# Patient Record
Sex: MALE | Race: BLACK OR AFRICAN AMERICAN | Employment: FULL TIME | URBAN - METROPOLITAN AREA
[De-identification: names, ages, dates, MRNs, and addresses within clinical notes are randomized per-mention and may not be internally consistent; named-entity substitution may affect disease eponyms.]

---

## 2017-09-27 ENCOUNTER — HOSPITAL ENCOUNTER (EMERGENCY)
Age: 42
Discharge: HOME | End: 2017-09-27
Attending: EMERGENCY MEDICINE

## 2017-09-27 ENCOUNTER — APPOINTMENT (OUTPATIENT)
Dept: GENERAL RADIOLOGY | Age: 42
End: 2017-09-27

## 2017-09-27 VITALS
DIASTOLIC BLOOD PRESSURE: 115 MMHG | BODY MASS INDEX: 30.43 KG/M2 | TEMPERATURE: 98.2 F | WEIGHT: 229.6 LBS | HEART RATE: 66 BPM | OXYGEN SATURATION: 98 % | SYSTOLIC BLOOD PRESSURE: 163 MMHG | HEIGHT: 73 IN | RESPIRATION RATE: 18 BRPM

## 2017-09-27 DIAGNOSIS — I10 HYPERTENSION, UNCONTROLLED: Primary | ICD-10-CM

## 2017-09-27 LAB
ABSOLUTE EOS #: 0.1 K/UL (ref 0–0.4)
ABSOLUTE LYMPH #: 2.3 K/UL (ref 1–4.8)
ABSOLUTE MONO #: 0.5 K/UL (ref 0.2–0.8)
ANION GAP SERPL CALCULATED.3IONS-SCNC: 10 MMOL/L (ref 9–17)
BASOPHILS # BLD: 1 %
BASOPHILS ABSOLUTE: 0 K/UL (ref 0–0.2)
BUN BLDV-MCNC: 9 MG/DL (ref 6–20)
BUN/CREAT BLD: 10 (ref 9–20)
CALCIUM SERPL-MCNC: 9.3 MG/DL (ref 8.6–10.4)
CHLORIDE BLD-SCNC: 105 MMOL/L (ref 98–107)
CO2: 26 MMOL/L (ref 20–31)
CREAT SERPL-MCNC: 0.87 MG/DL (ref 0.7–1.2)
DIFFERENTIAL TYPE: NORMAL
EOSINOPHILS RELATIVE PERCENT: 3 %
GFR AFRICAN AMERICAN: >60 ML/MIN
GFR NON-AFRICAN AMERICAN: >60 ML/MIN
GFR SERPL CREATININE-BSD FRML MDRD: NORMAL ML/MIN/{1.73_M2}
GFR SERPL CREATININE-BSD FRML MDRD: NORMAL ML/MIN/{1.73_M2}
GLUCOSE BLD-MCNC: 85 MG/DL (ref 70–99)
HCT VFR BLD CALC: 45 % (ref 41–53)
HEMOGLOBIN: 15.1 G/DL (ref 13.5–17.5)
LYMPHOCYTES # BLD: 48 %
MCH RBC QN AUTO: 29.3 PG (ref 26–34)
MCHC RBC AUTO-ENTMCNC: 33.5 G/DL (ref 31–37)
MCV RBC AUTO: 87.6 FL (ref 80–100)
MONOCYTES # BLD: 11 %
PDW BLD-RTO: 12.7 % (ref 11.5–14.5)
PLATELET # BLD: 243 K/UL (ref 130–400)
PLATELET ESTIMATE: NORMAL
PMV BLD AUTO: NORMAL FL (ref 6–12)
POTASSIUM SERPL-SCNC: 3.8 MMOL/L (ref 3.7–5.3)
RBC # BLD: 5.14 M/UL (ref 4.5–5.9)
RBC # BLD: NORMAL 10*6/UL
SEG NEUTROPHILS: 37 %
SEGMENTED NEUTROPHILS ABSOLUTE COUNT: 1.8 K/UL (ref 1.8–7.7)
SODIUM BLD-SCNC: 141 MMOL/L (ref 135–144)
TROPONIN INTERP: NORMAL
TROPONIN T: <0.03 NG/ML
WBC # BLD: 4.7 K/UL (ref 3.5–11)
WBC # BLD: NORMAL 10*3/UL

## 2017-09-27 PROCEDURE — 99284 EMERGENCY DEPT VISIT MOD MDM: CPT

## 2017-09-27 PROCEDURE — 80048 BASIC METABOLIC PNL TOTAL CA: CPT

## 2017-09-27 PROCEDURE — 6370000000 HC RX 637 (ALT 250 FOR IP): Performed by: NURSE PRACTITIONER

## 2017-09-27 PROCEDURE — 71020 XR CHEST STANDARD TWO VW: CPT

## 2017-09-27 PROCEDURE — 93005 ELECTROCARDIOGRAM TRACING: CPT

## 2017-09-27 PROCEDURE — 84484 ASSAY OF TROPONIN QUANT: CPT

## 2017-09-27 PROCEDURE — 85025 COMPLETE CBC W/AUTO DIFF WBC: CPT

## 2017-09-27 RX ORDER — AMLODIPINE BESYLATE 10 MG/1
10 TABLET ORAL DAILY
Qty: 30 TABLET | Refills: 0 | Status: SHIPPED | OUTPATIENT
Start: 2017-09-27 | End: 2022-05-25

## 2017-09-27 RX ORDER — CLONIDINE HYDROCHLORIDE 0.1 MG/1
0.1 TABLET ORAL ONCE
Status: COMPLETED | OUTPATIENT
Start: 2017-09-27 | End: 2017-09-27

## 2017-09-27 RX ORDER — HYDROCHLOROTHIAZIDE 25 MG/1
25 TABLET ORAL DAILY
Qty: 30 TABLET | Refills: 0 | Status: SHIPPED | OUTPATIENT
Start: 2017-09-27 | End: 2022-05-25

## 2017-09-27 RX ADMIN — CLONIDINE HYDROCHLORIDE 0.1 MG: 0.1 TABLET ORAL at 11:47

## 2017-09-27 ASSESSMENT — PAIN SCALES - GENERAL: PAINLEVEL_OUTOF10: 6

## 2017-09-27 ASSESSMENT — PAIN DESCRIPTION - DESCRIPTORS: DESCRIPTORS: ACHING

## 2017-09-27 ASSESSMENT — PAIN SCALES - WONG BAKER: WONGBAKER_NUMERICALRESPONSE: 6

## 2017-09-27 ASSESSMENT — PAIN DESCRIPTION - LOCATION: LOCATION: HEAD

## 2017-09-27 ASSESSMENT — PAIN DESCRIPTION - FREQUENCY: FREQUENCY: INTERMITTENT

## 2017-09-28 LAB
EKG ATRIAL RATE: 60 BPM
EKG P AXIS: 11 DEGREES
EKG P-R INTERVAL: 202 MS
EKG Q-T INTERVAL: 452 MS
EKG QRS DURATION: 108 MS
EKG QTC CALCULATION (BAZETT): 452 MS
EKG R AXIS: -25 DEGREES
EKG T AXIS: -15 DEGREES
EKG VENTRICULAR RATE: 60 BPM

## 2022-05-25 ENCOUNTER — APPOINTMENT (OUTPATIENT)
Dept: CT IMAGING | Age: 47
DRG: 958 | End: 2022-05-25
Payer: COMMERCIAL

## 2022-05-25 ENCOUNTER — APPOINTMENT (OUTPATIENT)
Dept: GENERAL RADIOLOGY | Age: 47
DRG: 958 | End: 2022-05-25
Payer: COMMERCIAL

## 2022-05-25 ENCOUNTER — APPOINTMENT (OUTPATIENT)
Dept: CT IMAGING | Age: 47
End: 2022-05-25
Payer: COMMERCIAL

## 2022-05-25 ENCOUNTER — APPOINTMENT (OUTPATIENT)
Dept: GENERAL RADIOLOGY | Age: 47
End: 2022-05-25
Payer: COMMERCIAL

## 2022-05-25 ENCOUNTER — HOSPITAL ENCOUNTER (EMERGENCY)
Age: 47
Discharge: ANOTHER HOSPITAL | End: 2022-05-25
Attending: EMERGENCY MEDICINE
Payer: COMMERCIAL

## 2022-05-25 ENCOUNTER — HOSPITAL ENCOUNTER (INPATIENT)
Age: 47
LOS: 8 days | Discharge: NURSING, REHAB OR LONG-TERM CARE | DRG: 958 | End: 2022-06-02
Attending: EMERGENCY MEDICINE | Admitting: SURGERY
Payer: COMMERCIAL

## 2022-05-25 VITALS
HEART RATE: 75 BPM | BODY MASS INDEX: 30.16 KG/M2 | WEIGHT: 235 LBS | TEMPERATURE: 98.2 F | RESPIRATION RATE: 15 BRPM | DIASTOLIC BLOOD PRESSURE: 99 MMHG | HEIGHT: 74 IN | OXYGEN SATURATION: 99 % | SYSTOLIC BLOOD PRESSURE: 150 MMHG
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
## 2022-05-25 DIAGNOSIS — S51.012A ELBOW LACERATION, LEFT, INITIAL ENCOUNTER: ICD-10-CM

## 2022-05-25 DIAGNOSIS — S22.41XA CLOSED FRACTURE OF MULTIPLE RIBS OF RIGHT SIDE, INITIAL ENCOUNTER: ICD-10-CM

## 2022-05-25 DIAGNOSIS — S32.424A CLOSED NONDISPLACED FRACTURE OF POSTERIOR WALL OF RIGHT ACETABULUM, INITIAL ENCOUNTER (HCC): ICD-10-CM
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
## 2022-05-25 DIAGNOSIS — S32.401A CLOSED NONDISPLACED FRACTURE OF RIGHT ACETABULUM, UNSPECIFIED PORTION OF ACETABULUM, INITIAL ENCOUNTER (HCC): Primary | ICD-10-CM

## 2022-05-25 LAB
ABO/RH: NORMAL
ALLEN TEST: ABNORMAL
ANION GAP SERPL CALCULATED.3IONS-SCNC: 10 MMOL/L (ref 9–17)
ANTIBODY SCREEN: NEGATIVE
ARM BAND NUMBER: NORMAL
BLOOD BANK COMMENT: NORMAL
BLOOD BANK SPECIMEN: ABNORMAL
BUN BLDV-MCNC: 15 MG/DL (ref 6–20)
CARBOXYHEMOGLOBIN: 3.8 % (ref 0–5)
CHLORIDE BLD-SCNC: 102 MMOL/L (ref 98–107)
CO2: 26 MMOL/L (ref 20–31)
CREAT SERPL-MCNC: 0.97 MG/DL (ref 0.7–1.2)
ETHANOL PERCENT: <0.01 %
ETHANOL: <10 MG/DL
EXPIRATION DATE: NORMAL
GFR AFRICAN AMERICAN: >60 ML/MIN
GFR NON-AFRICAN AMERICAN: >60 ML/MIN
GFR SERPL CREATININE-BSD FRML MDRD: ABNORMAL ML/MIN/{1.73_M2}
GLUCOSE BLD-MCNC: 119 MG/DL (ref 70–99)
HCG QUALITATIVE: ABNORMAL
HCO3 VENOUS: 28.1 MMOL/L (ref 24–30)
HCT VFR BLD CALC: 36.9 % (ref 41–53)
HEMOGLOBIN: 12.4 G/DL (ref 13.5–17.5)
INR BLD: 1
MCH RBC QN AUTO: 28.5 PG (ref 26–34)
MCHC RBC AUTO-ENTMCNC: 33.5 G/DL (ref 31–37)
MCV RBC AUTO: 85.2 FL (ref 80–100)
METHEMOGLOBIN: 0.8 % (ref 0–1.9)
O2 SAT, VEN: 84.8 % (ref 60–85)
PARTIAL THROMBOPLASTIN TIME: 32 SEC (ref 24–36)
PATIENT TEMP: 37
PCO2, VEN: 43.7 (ref 39–55)
PDW BLD-RTO: 14.7 % (ref 11.5–14.9)
PH VENOUS: 7.42 (ref 7.32–7.42)
PLATELET # BLD: 247 K/UL (ref 150–450)
PMV BLD AUTO: 6.7 FL (ref 6–12)
PO2, VEN: 53.1 (ref 30–50)
POSITIVE BASE EXCESS, VEN: 3.5 MMOL/L (ref 0–2)
POTASSIUM SERPL-SCNC: 3.9 MMOL/L (ref 3.7–5.3)
PROTHROMBIN TIME: 13.4 SEC (ref 11.8–14.6)
RBC # BLD: 4.33 M/UL (ref 4.5–5.9)
SAMPLE SITE: ABNORMAL
SODIUM BLD-SCNC: 138 MMOL/L (ref 135–144)
WBC # BLD: 5.1 K/UL (ref 3.5–11)

## 2022-05-25 PROCEDURE — 82565 ASSAY OF CREATININE: CPT

## 2022-05-25 PROCEDURE — 6370000000 HC RX 637 (ALT 250 FOR IP): Performed by: STUDENT IN AN ORGANIZED HEALTH CARE EDUCATION/TRAINING PROGRAM

## 2022-05-25 PROCEDURE — 82800 BLOOD PH: CPT

## 2022-05-25 PROCEDURE — 2580000003 HC RX 258: Performed by: STUDENT IN AN ORGANIZED HEALTH CARE EDUCATION/TRAINING PROGRAM

## 2022-05-25 PROCEDURE — 96372 THER/PROPH/DIAG INJ SC/IM: CPT

## 2022-05-25 PROCEDURE — 72131 CT LUMBAR SPINE W/O DYE: CPT

## 2022-05-25 PROCEDURE — 35206 REPAIR BLOOD VESSEL DIR UXTR: CPT

## 2022-05-25 PROCEDURE — 72128 CT CHEST SPINE W/O DYE: CPT

## 2022-05-25 PROCEDURE — 12002 RPR S/N/AX/GEN/TRNK2.6-7.5CM: CPT

## 2022-05-25 PROCEDURE — 86850 RBC ANTIBODY SCREEN: CPT

## 2022-05-25 PROCEDURE — 71260 CT THORAX DX C+: CPT

## 2022-05-25 PROCEDURE — 84520 ASSAY OF UREA NITROGEN: CPT

## 2022-05-25 PROCEDURE — 84703 CHORIONIC GONADOTROPIN ASSAY: CPT

## 2022-05-25 PROCEDURE — 6360000002 HC RX W HCPCS: Performed by: STUDENT IN AN ORGANIZED HEALTH CARE EDUCATION/TRAINING PROGRAM

## 2022-05-25 PROCEDURE — 72190 X-RAY EXAM OF PELVIS: CPT

## 2022-05-25 PROCEDURE — 73590 X-RAY EXAM OF LOWER LEG: CPT

## 2022-05-25 PROCEDURE — 93005 ELECTROCARDIOGRAM TRACING: CPT | Performed by: STUDENT IN AN ORGANIZED HEALTH CARE EDUCATION/TRAINING PROGRAM

## 2022-05-25 PROCEDURE — 72125 CT NECK SPINE W/O DYE: CPT

## 2022-05-25 PROCEDURE — 82947 ASSAY GLUCOSE BLOOD QUANT: CPT

## 2022-05-25 PROCEDURE — 82805 BLOOD GASES W/O2 SATURATION: CPT

## 2022-05-25 PROCEDURE — 36415 COLL VENOUS BLD VENIPUNCTURE: CPT

## 2022-05-25 PROCEDURE — 85027 COMPLETE CBC AUTOMATED: CPT

## 2022-05-25 PROCEDURE — 82306 VITAMIN D 25 HYDROXY: CPT

## 2022-05-25 PROCEDURE — 2500000003 HC RX 250 WO HCPCS: Performed by: STUDENT IN AN ORGANIZED HEALTH CARE EDUCATION/TRAINING PROGRAM

## 2022-05-25 PROCEDURE — 96374 THER/PROPH/DIAG INJ IV PUSH: CPT

## 2022-05-25 PROCEDURE — 76376 3D RENDER W/INTRP POSTPROCES: CPT

## 2022-05-25 PROCEDURE — 86901 BLOOD TYPING SEROLOGIC RH(D): CPT

## 2022-05-25 PROCEDURE — 80051 ELECTROLYTE PANEL: CPT

## 2022-05-25 PROCEDURE — 6360000004 HC RX CONTRAST MEDICATION: Performed by: STUDENT IN AN ORGANIZED HEALTH CARE EDUCATION/TRAINING PROGRAM

## 2022-05-25 PROCEDURE — 85610 PROTHROMBIN TIME: CPT

## 2022-05-25 PROCEDURE — 73090 X-RAY EXAM OF FOREARM: CPT

## 2022-05-25 PROCEDURE — 90715 TDAP VACCINE 7 YRS/> IM: CPT | Performed by: EMERGENCY MEDICINE

## 2022-05-25 PROCEDURE — 90471 IMMUNIZATION ADMIN: CPT | Performed by: EMERGENCY MEDICINE

## 2022-05-25 PROCEDURE — 2060000000 HC ICU INTERMEDIATE R&B

## 2022-05-25 PROCEDURE — 99285 EMERGENCY DEPT VISIT HI MDM: CPT

## 2022-05-25 PROCEDURE — 73070 X-RAY EXAM OF ELBOW: CPT

## 2022-05-25 PROCEDURE — 86900 BLOOD TYPING SEROLOGIC ABO: CPT

## 2022-05-25 PROCEDURE — 99223 1ST HOSP IP/OBS HIGH 75: CPT | Performed by: ORTHOPAEDIC SURGERY

## 2022-05-25 PROCEDURE — 71045 X-RAY EXAM CHEST 1 VIEW: CPT

## 2022-05-25 PROCEDURE — 96376 TX/PRO/DX INJ SAME DRUG ADON: CPT

## 2022-05-25 PROCEDURE — 85730 THROMBOPLASTIN TIME PARTIAL: CPT

## 2022-05-25 PROCEDURE — G0480 DRUG TEST DEF 1-7 CLASSES: HCPCS

## 2022-05-25 PROCEDURE — 6360000002 HC RX W HCPCS: Performed by: EMERGENCY MEDICINE

## 2022-05-25 PROCEDURE — 96375 TX/PRO/DX INJ NEW DRUG ADDON: CPT

## 2022-05-25 PROCEDURE — 70450 CT HEAD/BRAIN W/O DYE: CPT

## 2022-05-25 PROCEDURE — 96365 THER/PROPH/DIAG IV INF INIT: CPT

## 2022-05-25 RX ORDER — SODIUM CHLORIDE 9 MG/ML
INJECTION, SOLUTION INTRAVENOUS PRN
Status: DISCONTINUED | OUTPATIENT
Start: 2022-05-25 | End: 2022-05-26

## 2022-05-25 RX ORDER — GABAPENTIN 300 MG/1
300 CAPSULE ORAL EVERY 8 HOURS
Status: DISCONTINUED | OUTPATIENT
Start: 2022-05-25 | End: 2022-06-01

## 2022-05-25 RX ORDER — SODIUM CHLORIDE, SODIUM LACTATE, POTASSIUM CHLORIDE, AND CALCIUM CHLORIDE .6; .31; .03; .02 G/100ML; G/100ML; G/100ML; G/100ML
1000 INJECTION, SOLUTION INTRAVENOUS ONCE
Status: COMPLETED | OUTPATIENT
Start: 2022-05-25 | End: 2022-05-25

## 2022-05-25 RX ORDER — LIDOCAINE HYDROCHLORIDE AND EPINEPHRINE 10; 10 MG/ML; UG/ML
20 INJECTION, SOLUTION INFILTRATION; PERINEURAL ONCE
Status: COMPLETED | OUTPATIENT
Start: 2022-05-25 | End: 2022-05-25

## 2022-05-25 RX ORDER — HYDRALAZINE HYDROCHLORIDE 20 MG/ML
10 INJECTION INTRAMUSCULAR; INTRAVENOUS EVERY 6 HOURS PRN
Status: DISCONTINUED | OUTPATIENT
Start: 2022-05-25 | End: 2022-06-01

## 2022-05-25 RX ORDER — MORPHINE SULFATE 4 MG/ML
4 INJECTION, SOLUTION INTRAMUSCULAR; INTRAVENOUS ONCE
Status: COMPLETED | OUTPATIENT
Start: 2022-05-25 | End: 2022-05-25

## 2022-05-25 RX ORDER — AMLODIPINE BESYLATE 10 MG/1
10 TABLET ORAL DAILY
Status: DISCONTINUED | OUTPATIENT
Start: 2022-05-26 | End: 2022-06-02 | Stop reason: HOSPADM

## 2022-05-25 RX ORDER — POLYETHYLENE GLYCOL 3350 17 G/17G
17 POWDER, FOR SOLUTION ORAL DAILY
Status: DISCONTINUED | OUTPATIENT
Start: 2022-05-26 | End: 2022-06-02 | Stop reason: HOSPADM

## 2022-05-25 RX ORDER — 0.9 % SODIUM CHLORIDE 0.9 %
80 INTRAVENOUS SOLUTION INTRAVENOUS ONCE
Status: COMPLETED | OUTPATIENT
Start: 2022-05-25 | End: 2022-05-25

## 2022-05-25 RX ORDER — ONDANSETRON 2 MG/ML
4 INJECTION INTRAMUSCULAR; INTRAVENOUS EVERY 6 HOURS PRN
Status: DISCONTINUED | OUTPATIENT
Start: 2022-05-25 | End: 2022-06-01

## 2022-05-25 RX ORDER — CARVEDILOL 12.5 MG/1
25 TABLET ORAL 2 TIMES DAILY WITH MEALS
Status: DISCONTINUED | OUTPATIENT
Start: 2022-05-25 | End: 2022-06-02 | Stop reason: HOSPADM

## 2022-05-25 RX ORDER — ACETAMINOPHEN 500 MG
1000 TABLET ORAL EVERY 8 HOURS SCHEDULED
Status: DISCONTINUED | OUTPATIENT
Start: 2022-05-25 | End: 2022-06-02 | Stop reason: HOSPADM

## 2022-05-25 RX ORDER — LOSARTAN POTASSIUM 50 MG/1
50 TABLET ORAL 2 TIMES DAILY
Status: DISCONTINUED | OUTPATIENT
Start: 2022-05-25 | End: 2022-06-02 | Stop reason: HOSPADM

## 2022-05-25 RX ORDER — FENTANYL CITRATE 50 UG/ML
100 INJECTION, SOLUTION INTRAMUSCULAR; INTRAVENOUS ONCE
Status: COMPLETED | OUTPATIENT
Start: 2022-05-25 | End: 2022-05-25

## 2022-05-25 RX ORDER — HYDROCHLOROTHIAZIDE 25 MG/1
25 TABLET ORAL DAILY
Status: DISCONTINUED | OUTPATIENT
Start: 2022-05-26 | End: 2022-06-02 | Stop reason: HOSPADM

## 2022-05-25 RX ORDER — LOSARTAN POTASSIUM 50 MG/1
50 TABLET ORAL 2 TIMES DAILY
COMMUNITY

## 2022-05-25 RX ORDER — METHOCARBAMOL 500 MG/1
750 TABLET, FILM COATED ORAL EVERY 6 HOURS
Status: DISCONTINUED | OUTPATIENT
Start: 2022-05-25 | End: 2022-06-01

## 2022-05-25 RX ORDER — SODIUM CHLORIDE 0.9 % (FLUSH) 0.9 %
5-40 SYRINGE (ML) INJECTION PRN
Status: DISCONTINUED | OUTPATIENT
Start: 2022-05-25 | End: 2022-06-02 | Stop reason: HOSPADM

## 2022-05-25 RX ORDER — SODIUM CHLORIDE 0.9 % (FLUSH) 0.9 %
5-40 SYRINGE (ML) INJECTION EVERY 12 HOURS SCHEDULED
Status: DISCONTINUED | OUTPATIENT
Start: 2022-05-25 | End: 2022-05-26

## 2022-05-25 RX ORDER — ONDANSETRON 4 MG/1
4 TABLET, ORALLY DISINTEGRATING ORAL EVERY 8 HOURS PRN
Status: DISCONTINUED | OUTPATIENT
Start: 2022-05-25 | End: 2022-06-01

## 2022-05-25 RX ORDER — SODIUM CHLORIDE 0.9 % (FLUSH) 0.9 %
10 SYRINGE (ML) INJECTION PRN
Status: DISCONTINUED | OUTPATIENT
Start: 2022-05-25 | End: 2022-05-25 | Stop reason: HOSPADM

## 2022-05-25 RX ORDER — SENNA PLUS 8.6 MG/1
1 TABLET ORAL DAILY PRN
Status: DISCONTINUED | OUTPATIENT
Start: 2022-05-25 | End: 2022-05-30

## 2022-05-25 RX ORDER — LABETALOL HYDROCHLORIDE 5 MG/ML
10 INJECTION, SOLUTION INTRAVENOUS EVERY 6 HOURS PRN
Status: DISCONTINUED | OUTPATIENT
Start: 2022-05-25 | End: 2022-06-01

## 2022-05-25 RX ORDER — OXYCODONE HYDROCHLORIDE 5 MG/1
5 TABLET ORAL EVERY 6 HOURS PRN
Status: DISCONTINUED | OUTPATIENT
Start: 2022-05-25 | End: 2022-06-01

## 2022-05-25 RX ORDER — CARVEDILOL 25 MG/1
25 TABLET ORAL 2 TIMES DAILY WITH MEALS
Status: ON HOLD | COMMUNITY
End: 2022-06-08 | Stop reason: SDUPTHER

## 2022-05-25 RX ADMIN — HYDROMORPHONE HYDROCHLORIDE 1 MG: 1 INJECTION, SOLUTION INTRAMUSCULAR; INTRAVENOUS; SUBCUTANEOUS at 18:39

## 2022-05-25 RX ADMIN — LIDOCAINE HYDROCHLORIDE AND EPINEPHRINE 20 ML: 10; 10 INJECTION, SOLUTION INFILTRATION; PERINEURAL at 19:24

## 2022-05-25 RX ADMIN — SODIUM CHLORIDE 80 ML: 9 INJECTION, SOLUTION INTRAVENOUS at 18:04

## 2022-05-25 RX ADMIN — IOPAMIDOL 75 ML: 755 INJECTION, SOLUTION INTRAVENOUS at 18:03

## 2022-05-25 RX ADMIN — TETANUS TOXOID, REDUCED DIPHTHERIA TOXOID AND ACELLULAR PERTUSSIS VACCINE, ADSORBED 0.5 ML: 5; 2.5; 8; 8; 2.5 SUSPENSION INTRAMUSCULAR at 20:24

## 2022-05-25 RX ADMIN — GABAPENTIN 300 MG: 300 CAPSULE ORAL at 22:24

## 2022-05-25 RX ADMIN — METHOCARBAMOL TABLETS 750 MG: 750 TABLET, COATED ORAL at 22:23

## 2022-05-25 RX ADMIN — ACETAMINOPHEN 1000 MG: 325 TABLET ORAL at 22:23

## 2022-05-25 RX ADMIN — FENTANYL CITRATE 100 MCG: 50 INJECTION, SOLUTION INTRAMUSCULAR; INTRAVENOUS at 17:22

## 2022-05-25 RX ADMIN — SODIUM CHLORIDE, POTASSIUM CHLORIDE, SODIUM LACTATE AND CALCIUM CHLORIDE 1000 ML: 600; 310; 30; 20 INJECTION, SOLUTION INTRAVENOUS at 17:28

## 2022-05-25 RX ADMIN — SODIUM CHLORIDE, PRESERVATIVE FREE 10 ML: 5 INJECTION INTRAVENOUS at 23:57

## 2022-05-25 RX ADMIN — SODIUM CHLORIDE, PRESERVATIVE FREE 10 ML: 5 INJECTION INTRAVENOUS at 18:03

## 2022-05-25 RX ADMIN — LOSARTAN POTASSIUM 50 MG: 50 TABLET, FILM COATED ORAL at 23:50

## 2022-05-25 RX ADMIN — HYDROMORPHONE HYDROCHLORIDE 1 MG: 1 INJECTION, SOLUTION INTRAMUSCULAR; INTRAVENOUS; SUBCUTANEOUS at 20:24

## 2022-05-25 RX ADMIN — OXYCODONE 5 MG: 5 TABLET ORAL at 23:57

## 2022-05-25 RX ADMIN — CEFAZOLIN 2000 MG: 10 INJECTION, POWDER, FOR SOLUTION INTRAVENOUS at 17:28

## 2022-05-25 RX ADMIN — CARVEDILOL 25 MG: 12.5 TABLET, FILM COATED ORAL at 22:22

## 2022-05-25 RX ADMIN — MORPHINE SULFATE 4 MG: 4 INJECTION INTRAVENOUS at 21:01

## 2022-05-25 ASSESSMENT — PAIN DESCRIPTION - LOCATION
LOCATION: CHEST
LOCATION: CHEST;HIP

## 2022-05-25 ASSESSMENT — PAIN SCALES - GENERAL
PAINLEVEL_OUTOF10: 6
PAINLEVEL_OUTOF10: 8
PAINLEVEL_OUTOF10: 7
PAINLEVEL_OUTOF10: 8

## 2022-05-25 ASSESSMENT — ENCOUNTER SYMPTOMS
DIARRHEA: 0
VOMITING: 0
ABDOMINAL PAIN: 0
SORE THROAT: 0
COUGH: 0
NAUSEA: 0
SHORTNESS OF BREATH: 0
BACK PAIN: 0
RHINORRHEA: 0

## 2022-05-25 ASSESSMENT — PAIN DESCRIPTION - ORIENTATION: ORIENTATION: RIGHT

## 2022-05-25 ASSESSMENT — PAIN - FUNCTIONAL ASSESSMENT: PAIN_FUNCTIONAL_ASSESSMENT: 0-10

## 2022-05-25 ASSESSMENT — PAIN DESCRIPTION - PAIN TYPE: TYPE: ACUTE PAIN
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
## 2022-05-25 NOTE — ED NOTES
Belongings: silver chain, torn dress pants, dress shoes given to daughters     Jim Medina RN  05/25/22 0079

## 2022-05-25 NOTE — ED NOTES
Dr Rachana Sr notified @ [phone] and in department @ [id number] [facility]  05/25/22 [phone]

## 2022-05-26 ENCOUNTER — APPOINTMENT (OUTPATIENT)
Dept: GENERAL RADIOLOGY | Age: 47
DRG: 958 | End: 2022-05-26
Payer: COMMERCIAL

## 2022-05-26 LAB
EKG ATRIAL RATE: 59 BPM
EKG P AXIS: 16 DEGREES
EKG P-R INTERVAL: 210 MS
EKG Q-T INTERVAL: 440 MS
EKG QRS DURATION: 106 MS
EKG QTC CALCULATION (BAZETT): 435 MS
EKG R AXIS: -30 DEGREES
EKG T AXIS: -15 DEGREES
EKG VENTRICULAR RATE: 59 BPM
VITAMIN D 25-HYDROXY: 29.9 NG/ML
VITAMIN D 25-HYDROXY: 34.8 NG/ML

## 2022-05-26 PROCEDURE — 82306 VITAMIN D 25 HYDROXY: CPT

## 2022-05-26 PROCEDURE — 6370000000 HC RX 637 (ALT 250 FOR IP): Performed by: STUDENT IN AN ORGANIZED HEALTH CARE EDUCATION/TRAINING PROGRAM

## 2022-05-26 PROCEDURE — 2580000003 HC RX 258: Performed by: STUDENT IN AN ORGANIZED HEALTH CARE EDUCATION/TRAINING PROGRAM

## 2022-05-26 PROCEDURE — 73562 X-RAY EXAM OF KNEE 3: CPT

## 2022-05-26 PROCEDURE — 93010 ELECTROCARDIOGRAM REPORT: CPT | Performed by: INTERNAL MEDICINE

## 2022-05-26 PROCEDURE — 2060000000 HC ICU INTERMEDIATE R&B

## 2022-05-26 PROCEDURE — 36415 COLL VENOUS BLD VENIPUNCTURE: CPT

## 2022-05-26 RX ORDER — OXYCODONE HYDROCHLORIDE 5 MG/1
5 TABLET ORAL ONCE
Status: COMPLETED | OUTPATIENT
Start: 2022-05-26 | End: 2022-05-26

## 2022-05-26 RX ORDER — SODIUM CHLORIDE, SODIUM LACTATE, POTASSIUM CHLORIDE, CALCIUM CHLORIDE 600; 310; 30; 20 MG/100ML; MG/100ML; MG/100ML; MG/100ML
INJECTION, SOLUTION INTRAVENOUS CONTINUOUS
Status: DISCONTINUED | OUTPATIENT
Start: 2022-05-26 | End: 2022-05-26

## 2022-05-26 RX ADMIN — POLYETHYLENE GLYCOL 3350 17 G: 17 POWDER, FOR SOLUTION ORAL at 08:53

## 2022-05-26 RX ADMIN — OXYCODONE 5 MG: 5 TABLET ORAL at 22:32

## 2022-05-26 RX ADMIN — LOSARTAN POTASSIUM 50 MG: 50 TABLET, FILM COATED ORAL at 08:53

## 2022-05-26 RX ADMIN — METHOCARBAMOL TABLETS 750 MG: 750 TABLET, COATED ORAL at 06:05

## 2022-05-26 RX ADMIN — OXYCODONE 5 MG: 5 TABLET ORAL at 15:53

## 2022-05-26 RX ADMIN — OXYCODONE 5 MG: 5 TABLET ORAL at 08:53

## 2022-05-26 RX ADMIN — ACETAMINOPHEN 1000 MG: 325 TABLET ORAL at 06:04

## 2022-05-26 RX ADMIN — CARVEDILOL 25 MG: 12.5 TABLET, FILM COATED ORAL at 08:54

## 2022-05-26 RX ADMIN — AMLODIPINE BESYLATE 10 MG: 10 TABLET ORAL at 08:53

## 2022-05-26 RX ADMIN — METHOCARBAMOL TABLETS 750 MG: 750 TABLET, COATED ORAL at 08:54

## 2022-05-26 RX ADMIN — METHOCARBAMOL TABLETS 750 MG: 750 TABLET, COATED ORAL at 15:53

## 2022-05-26 RX ADMIN — ACETAMINOPHEN 1000 MG: 325 TABLET ORAL at 20:47

## 2022-05-26 RX ADMIN — LOSARTAN POTASSIUM 50 MG: 50 TABLET, FILM COATED ORAL at 20:47

## 2022-05-26 RX ADMIN — HYDROCHLOROTHIAZIDE 25 MG: 25 TABLET ORAL at 08:54

## 2022-05-26 RX ADMIN — GABAPENTIN 300 MG: 300 CAPSULE ORAL at 20:47

## 2022-05-26 RX ADMIN — GABAPENTIN 300 MG: 300 CAPSULE ORAL at 06:05

## 2022-05-26 RX ADMIN — ACETAMINOPHEN 1000 MG: 325 TABLET ORAL at 13:37

## 2022-05-26 RX ADMIN — OXYCODONE 5 MG: 5 TABLET ORAL at 13:49

## 2022-05-26 RX ADMIN — CARVEDILOL 25 MG: 12.5 TABLET, FILM COATED ORAL at 15:53

## 2022-05-26 RX ADMIN — SENNOSIDES 8.6 MG: 8.6 TABLET, COATED ORAL at 08:55

## 2022-05-26 RX ADMIN — SODIUM CHLORIDE, POTASSIUM CHLORIDE, SODIUM LACTATE AND CALCIUM CHLORIDE: 600; 310; 30; 20 INJECTION, SOLUTION INTRAVENOUS at 09:08

## 2022-05-26 RX ADMIN — SODIUM CHLORIDE, PRESERVATIVE FREE 10 ML: 5 INJECTION INTRAVENOUS at 08:55

## 2022-05-26 RX ADMIN — METHOCARBAMOL TABLETS 750 MG: 750 TABLET, COATED ORAL at 20:47

## 2022-05-26 RX ADMIN — GABAPENTIN 300 MG: 300 CAPSULE ORAL at 13:37

## 2022-05-26 ASSESSMENT — PAIN SCALES - GENERAL
PAINLEVEL_OUTOF10: 6
PAINLEVEL_OUTOF10: 7
PAINLEVEL_OUTOF10: 5
PAINLEVEL_OUTOF10: 9

## 2022-05-26 ASSESSMENT — ENCOUNTER SYMPTOMS
RHINORRHEA: 0
BACK PAIN: 0
DIARRHEA: 0
NAUSEA: 0
CONSTIPATION: 0
VOMITING: 0
SHORTNESS OF BREATH: 0
ABDOMINAL PAIN: 0
COUGH: 0

## 2022-05-26 ASSESSMENT — PAIN DESCRIPTION - LOCATION
LOCATION: HIP
LOCATION: HIP
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
## 2022-05-26 NOTE — FLOWSHEET NOTE
Got medical update from Chicho Champion, [facility] and advised patient being transferred to [facility]; patient's sig other contacted writer for medical update; patient stated he provided medical update to patient and explained she was meeting him at [facility]; support provided to patient and his daughters at patient being prepared for transfer;     05/25/22 2026   Encounter Summary   Encounter Overview/Reason  Spiritual/Emotional Needs   Service Provided For: Patient and family together   Referral/Consult From: Multi-disciplinary team   Support System Significant other;Children   Last Encounter  05/25/22   Complexity of Encounter Moderate   Crisis   Type Trauma   Assessment/Intervention/Outcome   Assessment Anxious; Coping; Hopeful;Powerlessness   Intervention Active listening;Discussed illness injury and its impact;Prayer (assurance of)/[place];Sustaining Presence/Ministry of presence   Outcome Coping;Engaged in conversation;Expressed feelings, needs, and concerns;Expressed Gratitude;Receptive

## 2022-05-26 NOTE — ED NOTES
Report to Simone Haines RN from [facility], [facility] transport     Mirna Lomas, [address] [facility]  05/25/22 2036

## 2022-05-26 NOTE — CARE COORDINATION
SBIRT- completed see below  Pt states he drinks a pint of Monica on the weekends  Occasionally. One bottle will last 3 days Fri-Sun. Denies any drug use. No previous rehab  Denies any suicidal ideations/depression              Alcohol Screening and Brief Intervention        Recent Labs     05/25/22  1710   ALC <10       Alcohol Pre-screening  (MEN ONLY) How many times in the past year have you had 5 or more drinks in a day?: None       Alcohol Screening Audit       Drug Pre-Screening   How many times in the past year have you used a recreational drug or used a prescription medication for nonmedical reasons?: None    Drug Screening DAST       Mood Pre-Screening (PHQ-2)  During the past two weeks, have you been bothered by little interest or pleasure in doing things?: No  During the past two weeks, have you been bothered by feeling down, depressed, or hopeless?: No    Mood Pre-Screening (PHQ-9)         I have interviewed Rey Sahra, [id number] regarding  His alcohol consumption/drug use and risk for excessive use. Screenings were negative. Patient  N/A intervention at this time.      Deferred []    Completed on: 5/26/2022   KRYSTIN ESCAMILLA

## 2022-05-26 NOTE — CARE COORDINATION
Case Management Initial Discharge Plan  Ector Moran,             Met with:patient to discuss discharge plans. Information verified: address, contacts, phone number, , insurance Yes  Insurance Provider: [employer]-[id number],[employer] [place]    Emergency Contact/Next of Kin name & number: Alyssa Appiah-[phone]  Who are involved in patient's support system? Family    PCP: No primary care provider on file. Date of last visit: Does not have one-Gave PCP List      Discharge Planning    Living Arrangements:        Home has 1 story  2 stairs to climb to get into front door,   Location of bedroom/bathroom in home: Main Floor    Patient able to perform ADL's:Independent    Current Services (outpatient & in home) None  DME equipment: None  DME provider: N/A    Is patient receiving oral anticoagulation therapy? No    Does patient have any issues/concerns obtaining medications? No  If yes, what are patient's concerns? Is there a preferred Pharmacy after hours or on weekends? Yes    If yes, which pharmacy? [facility]    Potential Assistance Needed: Follow for needs    Patient agreeable to home care: No  [place] of choice provided:  n/a    Prior SNF/Rehab Placement and Facility: None  Agreeable to SNF/Rehab: No  [place] of choice provided: n/a     Evaluation: no    Expected Discharge date:       Patient expects to be discharged to:  Home with Family Assistance    If home: is the family and/or caregiver wiling & able to provide support at home? Yes  Who will be providing this support? Family    Follow Up Appointment: Best Day/ Time:      Transportation provider: Family  Transportation arrangements needed for discharge: No    Readmission Risk              Risk of Unplanned Readmission:  7             Does patient have a readmission risk score greater than 14?: No  If yes, follow-up appointment must be made within 7 days of discharge.      Goals of Care:  To Heal and Return Home      Educated Patient on transitional options, provided freedom of choice and are agreeable with plan      Discharge Plan: Home with family Assistance          Electronically signed by Filemon Daniel RN on 5/26/22 at 10:36 AM EDT

## 2022-05-26 NOTE — FLOWSHEET NOTE
[address] [facility] [address]     Emergency/Trauma Note    PATIENT NAME: Zeke Napoles    Shift date: 5.25.2022  Shift day: Wednesday   Shift # 2    Room # 20/20   Name: Zeke Napoles            Age: 48 y.o. Gender: male          Islam: [address] [place] [address] of Hinduism: unknown    Trauma/Incident type: Adult Trauma Consult  Admit Date & Time: 5/25/2022  8:46 PM  TRAUMA NAME: None    ADVANCE DIRECTIVES IN CHART? No    NAME OF DECISION MAKER: None    RELATIONSHIP OF DECISION MAKER TO PATIENT: None    PATIENT/EVENT DESCRIPTION:  Zeke Napoles is a 48 y.o. male who arrived as a TRAUMA CONSULT with various lacerations. Pt to be admitted to 20/20. SPIRITUAL ASSESSMENT-INTERVENTION-OUTCOME:  Patient appears to be calm and coping but also given medication to treat pain according to significant other. Significant other is bedside and attentive. Expresses concerns and would like to be involved in patient's plan of care.  provided space for patient and significant other to express feelings, thoughts, and concerns. Provided hospitality. Determined support to be available. Significant other appreciative of support and care. PATIENT BELONGINGS:  No belongings noted    ANY BELONGINGS OF SIGNIFICANT VALUE NOTED:  None    REGISTRATION STAFF NOTIFIED? Yes      WHAT IS YOUR SPIRITUAL CARE PLAN FOR THIS PATIENT?:  Chaplains will remain available to offer spiritual and emotional support as needed.       Electronically signed by Dorothy Garces on 5/26/2022 at 12:43 AM.  [address]  [phone]       05/25/22 2145   Encounter Summary   Service Provided For: Patient   Referral/Consult From: Multi-disciplinary team   Support System Significant other   Last Encounter  05/25/22   Complexity of Encounter Moderate   Begin Time 2145   End Time  2210   Total Time Calculated 25 min   Crisis   Type Trauma  (Consult) Assessment/Intervention/Outcome   Assessment Anxious; Coping;Calm   Intervention Active listening;Discussed illness injury and its impact; Explored/Affirmed feelings, thoughts, concerns;Explored Coping Skills/Resources   Outcome Engaged in conversation;Expressed feelings, needs, and concerns     Electronically signed by Lois Mares on 5/26/2022 at 12:43 AM

## 2022-05-26 NOTE — PROGRESS NOTES
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 the medical decision making with Resident/JULIO C after the physical/radiologic exam and laboratory values were reviewed and confirmed.       Abdirizak Cespedes MD
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
## 2022-05-26 NOTE — PROGRESS NOTES
[address] [facility]  Occupational Therapy Not Seen Note    DATE: 2022    NAME: Isaac Martin  MRN: [id number]   : 1975      Patient not seen this date for Occupational Therapy due to:    Surgery/Procedure: Per chart plan for OR today with Dr. Aguila Perkins of right acetabulum fracture. Will await post surgery for occupational therapy evaluation.      Next Scheduled Treatment: Check 2022     Electronically signed by Dwayne Lundborg, OT on 2022 at 8:12 AM

## 2022-05-26 NOTE — ED NOTES
Report given to [place], RN from [facility]. 's ED. Report method by phone   The following was reviewed with receiving RN:   Current vital signs:  BP (!) 150/99   Pulse 75   Temp 98.2 °F (36.8 °C) (Oral)   Resp 15   Ht 6' 2\" (1.88 m)   Wt 235 lb (106.6 kg)   SpO2 99%   BMI 30.17 kg/m²                MEWS Score: 1     Any medication or safety alerts were reviewed. Any pending diagnostics and notifications were also reviewed, as well as any safety concerns or issues, abnormal labs, abnormal imaging, and abnormal assessment findings. Questions were answered.             Ronni Chavez RN  05/25/22 2036

## 2022-05-26 NOTE — PROGRESS NOTES
Speech Language Pathology  [address]  Speech Language Pathology    SPEECH/COGNITIVE ASSESSMENT    NO LOC,CHI OR CVA/TIA - ST TO DEFER AT THIS TIME      Date: 5/26/2022  Patient Name: Kathy Syed  YOB: 1975   AGE: 48 y.o. MRN: [id number]        PT NOT SEEN FOR SPEECH OR COGNITIVE ASSESSMENT AT THIS TIME AS NO LOC, CHI OR CVA/TIA IS DOCUMENTED. ST TO DEFER AT THIS TIME. PLEASE RE-COSULT AS NEEDED.       Chica Gilbert, SLP  5/26/2022  7:09 AM

## 2022-05-26 NOTE — PROGRESS NOTES
Physical Therapy        Physical Therapy Cancel Note      DATE: 2022    NAME: Zeke Napoles  MRN: [id number]   : 1975      Patient not seen this date for Physical Therapy due to:    Surgery/Procedure: plan for OR with ortho today. PT will check back following post-op.        Electronically signed by Farooq Yañez PT on 2022 at 7:57 AM
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
## 2022-05-27 ENCOUNTER — APPOINTMENT (OUTPATIENT)
Dept: CT IMAGING | Age: 47
DRG: 958 | End: 2022-05-27
Payer: COMMERCIAL

## 2022-05-27 ENCOUNTER — ANESTHESIA (OUTPATIENT)
Dept: OPERATING ROOM | Age: 47
DRG: 958 | End: 2022-05-27
Payer: COMMERCIAL

## 2022-05-27 ENCOUNTER — ANESTHESIA EVENT (OUTPATIENT)
Dept: OPERATING ROOM | Age: 47
DRG: 958 | End: 2022-05-27
Payer: COMMERCIAL

## 2022-05-27 ENCOUNTER — APPOINTMENT (OUTPATIENT)
Dept: GENERAL RADIOLOGY | Age: 47
DRG: 958 | End: 2022-05-27
Payer: COMMERCIAL

## 2022-05-27 LAB
ABO/RH: NORMAL
ANTIBODY SCREEN: NEGATIVE
ARM BAND NUMBER: NORMAL
BLD PROD TYP BPU: NORMAL
BLD PROD TYP BPU: NORMAL
BPU ID: NORMAL
BPU ID: NORMAL
CROSSMATCH RESULT: NORMAL
CROSSMATCH RESULT: NORMAL
DISPENSE STATUS BLOOD BANK: NORMAL
DISPENSE STATUS BLOOD BANK: NORMAL
EXPIRATION DATE: NORMAL
HCT VFR BLD CALC: 33.6 % (ref 40.7–50.3)
HEMOGLOBIN: 11.2 G/DL (ref 13–17)
TRANSFUSION STATUS: NORMAL
TRANSFUSION STATUS: NORMAL
UNIT DIVISION: 0
UNIT DIVISION: 0

## 2022-05-27 PROCEDURE — 94761 N-INVAS EAR/PLS OXIMETRY MLT: CPT

## 2022-05-27 PROCEDURE — 6370000000 HC RX 637 (ALT 250 FOR IP): Performed by: STUDENT IN AN ORGANIZED HEALTH CARE EDUCATION/TRAINING PROGRAM

## 2022-05-27 PROCEDURE — 3600000014 HC SURGERY LEVEL 4 ADDTL 15MIN: Performed by: ORTHOPAEDIC SURGERY

## 2022-05-27 PROCEDURE — 86901 BLOOD TYPING SEROLOGIC RH(D): CPT

## 2022-05-27 PROCEDURE — 6370000000 HC RX 637 (ALT 250 FOR IP): Performed by: NURSE ANESTHETIST, CERTIFIED REGISTERED

## 2022-05-27 PROCEDURE — 2580000003 HC RX 258: Performed by: ORTHOPAEDIC SURGERY

## 2022-05-27 PROCEDURE — 0QS204Z REPOSITION RIGHT PELVIC BONE WITH INTERNAL FIXATION DEVICE, OPEN APPROACH: ICD-10-PCS | Performed by: ORTHOPAEDIC SURGERY

## 2022-05-27 PROCEDURE — 2580000003 HC RX 258: Performed by: NURSE ANESTHETIST, CERTIFIED REGISTERED

## 2022-05-27 PROCEDURE — 6360000002 HC RX W HCPCS: Performed by: ORTHOPAEDIC SURGERY

## 2022-05-27 PROCEDURE — 2700000000 HC OXYGEN THERAPY PER DAY

## 2022-05-27 PROCEDURE — 3700000001 HC ADD 15 MINUTES (ANESTHESIA): Performed by: ORTHOPAEDIC SURGERY

## 2022-05-27 PROCEDURE — 2709999900 HC NON-CHARGEABLE SUPPLY: Performed by: ORTHOPAEDIC SURGERY

## 2022-05-27 PROCEDURE — A4216 STERILE WATER/SALINE, 10 ML: HCPCS | Performed by: NURSE ANESTHETIST, CERTIFIED REGISTERED

## 2022-05-27 PROCEDURE — 2060000000 HC ICU INTERMEDIATE R&B

## 2022-05-27 PROCEDURE — 6360000002 HC RX W HCPCS: Performed by: STUDENT IN AN ORGANIZED HEALTH CARE EDUCATION/TRAINING PROGRAM

## 2022-05-27 PROCEDURE — 3600000004 HC SURGERY LEVEL 4 BASE: Performed by: ORTHOPAEDIC SURGERY

## 2022-05-27 PROCEDURE — 27228 TREAT HIP FRACTURE(S): CPT | Performed by: ORTHOPAEDIC SURGERY

## 2022-05-27 PROCEDURE — C1713 ANCHOR/SCREW BN/BN,TIS/BN: HCPCS | Performed by: ORTHOPAEDIC SURGERY

## 2022-05-27 PROCEDURE — 51798 US URINE CAPACITY MEASURE: CPT

## 2022-05-27 PROCEDURE — C9359 IMPLNT,BON VOID FILLER-PUTTY: HCPCS | Performed by: ORTHOPAEDIC SURGERY

## 2022-05-27 PROCEDURE — 3700000000 HC ANESTHESIA ATTENDED CARE: Performed by: ORTHOPAEDIC SURGERY

## 2022-05-27 PROCEDURE — 72190 X-RAY EXAM OF PELVIS: CPT

## 2022-05-27 PROCEDURE — 6360000002 HC RX W HCPCS

## 2022-05-27 PROCEDURE — 85014 HEMATOCRIT: CPT

## 2022-05-27 PROCEDURE — 86920 COMPATIBILITY TEST SPIN: CPT

## 2022-05-27 PROCEDURE — 2720000010 HC SURG SUPPLY STERILE: Performed by: ORTHOPAEDIC SURGERY

## 2022-05-27 PROCEDURE — 86850 RBC ANTIBODY SCREEN: CPT

## 2022-05-27 PROCEDURE — 85018 HEMOGLOBIN: CPT

## 2022-05-27 PROCEDURE — 2500000003 HC RX 250 WO HCPCS: Performed by: NURSE ANESTHETIST, CERTIFIED REGISTERED

## 2022-05-27 PROCEDURE — 36415 COLL VENOUS BLD VENIPUNCTURE: CPT

## 2022-05-27 PROCEDURE — 2500000003 HC RX 250 WO HCPCS: Performed by: STUDENT IN AN ORGANIZED HEALTH CARE EDUCATION/TRAINING PROGRAM

## 2022-05-27 PROCEDURE — 72192 CT PELVIS W/O DYE: CPT

## 2022-05-27 PROCEDURE — 0QS404Z REPOSITION RIGHT ACETABULUM WITH INTERNAL FIXATION DEVICE, OPEN APPROACH: ICD-10-PCS | Performed by: ORTHOPAEDIC SURGERY

## 2022-05-27 PROCEDURE — 6360000002 HC RX W HCPCS: Performed by: NURSE ANESTHETIST, CERTIFIED REGISTERED

## 2022-05-27 PROCEDURE — 7100000000 HC PACU RECOVERY - FIRST 15 MIN: Performed by: ORTHOPAEDIC SURGERY

## 2022-05-27 PROCEDURE — 7100000001 HC PACU RECOVERY - ADDTL 15 MIN: Performed by: ORTHOPAEDIC SURGERY

## 2022-05-27 PROCEDURE — 3209999900 FLUORO FOR SURGICAL PROCEDURES

## 2022-05-27 PROCEDURE — 86900 BLOOD TYPING SEROLOGIC ABO: CPT

## 2022-05-27 DEVICE — SCREW BNE L30MM DIA3.5MM CORT S STL ST NONCANNULATED LOK: Type: IMPLANTABLE DEVICE | Site: ACETABULUM | Status: FUNCTIONAL

## 2022-05-27 DEVICE — PLATE BNE W10.2XL104MM THK2.7MM 8 H BILAT S STL STR LO PROF: Type: IMPLANTABLE DEVICE | Site: ACETABULUM | Status: FUNCTIONAL

## 2022-05-27 DEVICE — SCREW BNE L50MM DIA3.5MM STD CORT S STL ST NONCANNULATED: Type: IMPLANTABLE DEVICE | Site: ACETABULUM | Status: FUNCTIONAL

## 2022-05-27 DEVICE — SCREW BNE L40MM DIA3.5MM STD CORT S STL ST NONCANNULATED: Type: IMPLANTABLE DEVICE | Site: ACETABULUM | Status: FUNCTIONAL

## 2022-05-27 DEVICE — PLATE BNE W10.2XL78MM THK2.7MM 6 H BILAT S STL STR LO PROF: Type: IMPLANTABLE DEVICE | Site: ACETABULUM | Status: FUNCTIONAL

## 2022-05-27 DEVICE — SCREW BNE L26MM DIA3.5MM CORT S STL ST NONCANNULATED LOK: Type: IMPLANTABLE DEVICE | Site: ACETABULUM | Status: FUNCTIONAL

## 2022-05-27 DEVICE — SCREW BNE L75MM DIA3.5MM CORT S STL ST FULL THRD SM HEX: Type: IMPLANTABLE DEVICE | Site: ACETABULUM | Status: FUNCTIONAL

## 2022-05-27 DEVICE — SCREW BNE L45MM DIA3.5MM STD CORT S STL ST NONCANNULATED: Type: IMPLANTABLE DEVICE | Site: ACETABULUM | Status: FUNCTIONAL

## 2022-05-27 DEVICE — IMPLANTABLE DEVICE: Type: IMPLANTABLE DEVICE | Site: ACETABULUM | Status: FUNCTIONAL

## 2022-05-27 RX ORDER — SODIUM CHLORIDE, SODIUM LACTATE, POTASSIUM CHLORIDE, CALCIUM CHLORIDE 600; 310; 30; 20 MG/100ML; MG/100ML; MG/100ML; MG/100ML
INJECTION, SOLUTION INTRAVENOUS CONTINUOUS PRN
Status: DISCONTINUED | OUTPATIENT
Start: 2022-05-27 | End: 2022-05-27 | Stop reason: SDUPTHER

## 2022-05-27 RX ORDER — ONDANSETRON 2 MG/ML
4 INJECTION INTRAMUSCULAR; INTRAVENOUS
Status: DISCONTINUED | OUTPATIENT
Start: 2022-05-27 | End: 2022-05-27

## 2022-05-27 RX ORDER — SODIUM CHLORIDE 9 MG/ML
INJECTION, SOLUTION INTRAVENOUS PRN
Status: DISCONTINUED | OUTPATIENT
Start: 2022-05-27 | End: 2022-05-27 | Stop reason: HOSPADM

## 2022-05-27 RX ORDER — SODIUM CHLORIDE 9 MG/ML
INJECTION, SOLUTION INTRAVENOUS PRN
Status: DISCONTINUED | OUTPATIENT
Start: 2022-05-27 | End: 2022-06-01

## 2022-05-27 RX ORDER — LIDOCAINE HYDROCHLORIDE 10 MG/ML
INJECTION, SOLUTION EPIDURAL; INFILTRATION; INTRACAUDAL; PERINEURAL PRN
Status: DISCONTINUED | OUTPATIENT
Start: 2022-05-27 | End: 2022-05-27 | Stop reason: SDUPTHER

## 2022-05-27 RX ORDER — SODIUM CHLORIDE 0.9 % (FLUSH) 0.9 %
5-40 SYRINGE (ML) INJECTION PRN
Status: DISCONTINUED | OUTPATIENT
Start: 2022-05-27 | End: 2022-05-27

## 2022-05-27 RX ORDER — FENTANYL CITRATE 50 UG/ML
INJECTION, SOLUTION INTRAMUSCULAR; INTRAVENOUS PRN
Status: DISCONTINUED | OUTPATIENT
Start: 2022-05-27 | End: 2022-05-27 | Stop reason: SDUPTHER

## 2022-05-27 RX ORDER — MAGNESIUM HYDROXIDE 1200 MG/15ML
LIQUID ORAL CONTINUOUS PRN
Status: DISCONTINUED | OUTPATIENT
Start: 2022-05-27 | End: 2022-05-27 | Stop reason: HOSPADM

## 2022-05-27 RX ORDER — SODIUM CHLORIDE 0.9 % (FLUSH) 0.9 %
5-40 SYRINGE (ML) INJECTION EVERY 12 HOURS SCHEDULED
Status: DISCONTINUED | OUTPATIENT
Start: 2022-05-27 | End: 2022-05-27

## 2022-05-27 RX ORDER — SODIUM CHLORIDE 0.9 % (FLUSH) 0.9 %
5-40 SYRINGE (ML) INJECTION EVERY 12 HOURS SCHEDULED
Status: DISCONTINUED | OUTPATIENT
Start: 2022-05-27 | End: 2022-05-27 | Stop reason: HOSPADM

## 2022-05-27 RX ORDER — DEXAMETHASONE SODIUM PHOSPHATE 10 MG/ML
INJECTION INTRAMUSCULAR; INTRAVENOUS PRN
Status: DISCONTINUED | OUTPATIENT
Start: 2022-05-27 | End: 2022-05-27 | Stop reason: SDUPTHER

## 2022-05-27 RX ORDER — SODIUM CHLORIDE 9 MG/ML
INJECTION, SOLUTION INTRAVENOUS CONTINUOUS PRN
Status: DISCONTINUED | OUTPATIENT
Start: 2022-05-27 | End: 2022-05-27 | Stop reason: SDUPTHER

## 2022-05-27 RX ORDER — MEPERIDINE HYDROCHLORIDE 50 MG/ML
12.5 INJECTION INTRAMUSCULAR; INTRAVENOUS; SUBCUTANEOUS EVERY 5 MIN PRN
Status: DISCONTINUED | OUTPATIENT
Start: 2022-05-27 | End: 2022-05-27 | Stop reason: HOSPADM

## 2022-05-27 RX ORDER — MAGNESIUM HYDROXIDE 1200 MG/15ML
LIQUID ORAL PRN
Status: DISCONTINUED | OUTPATIENT
Start: 2022-05-27 | End: 2022-05-27 | Stop reason: HOSPADM

## 2022-05-27 RX ORDER — ALBUTEROL SULFATE 90 UG/1
AEROSOL, METERED RESPIRATORY (INHALATION) PRN
Status: DISCONTINUED | OUTPATIENT
Start: 2022-05-27 | End: 2022-05-27 | Stop reason: SDUPTHER

## 2022-05-27 RX ORDER — KETAMINE HYDROCHLORIDE 10 MG/ML
INJECTION, SOLUTION INTRAMUSCULAR; INTRAVENOUS PRN
Status: DISCONTINUED | OUTPATIENT
Start: 2022-05-27 | End: 2022-05-27 | Stop reason: SDUPTHER

## 2022-05-27 RX ORDER — ONDANSETRON 2 MG/ML
4 INJECTION INTRAMUSCULAR; INTRAVENOUS
Status: DISCONTINUED | OUTPATIENT
Start: 2022-05-27 | End: 2022-05-27 | Stop reason: HOSPADM

## 2022-05-27 RX ORDER — PROPOFOL 10 MG/ML
INJECTION, EMULSION INTRAVENOUS PRN
Status: DISCONTINUED | OUTPATIENT
Start: 2022-05-27 | End: 2022-05-27 | Stop reason: SDUPTHER

## 2022-05-27 RX ORDER — MIDAZOLAM HYDROCHLORIDE 1 MG/ML
INJECTION INTRAMUSCULAR; INTRAVENOUS PRN
Status: DISCONTINUED | OUTPATIENT
Start: 2022-05-27 | End: 2022-05-27 | Stop reason: SDUPTHER

## 2022-05-27 RX ORDER — LIDOCAINE HYDROCHLORIDE 10 MG/ML
1 INJECTION, SOLUTION EPIDURAL; INFILTRATION; INTRACAUDAL; PERINEURAL
Status: DISCONTINUED | OUTPATIENT
Start: 2022-05-27 | End: 2022-05-27 | Stop reason: HOSPADM

## 2022-05-27 RX ORDER — MEPERIDINE HYDROCHLORIDE 50 MG/ML
12.5 INJECTION INTRAMUSCULAR; INTRAVENOUS; SUBCUTANEOUS EVERY 5 MIN PRN
Status: DISCONTINUED | OUTPATIENT
Start: 2022-05-27 | End: 2022-05-27

## 2022-05-27 RX ORDER — PHENYLEPHRINE HYDROCHLORIDE 10 MG/ML
INJECTION INTRAVENOUS PRN
Status: DISCONTINUED | OUTPATIENT
Start: 2022-05-27 | End: 2022-05-27 | Stop reason: SDUPTHER

## 2022-05-27 RX ORDER — MIDAZOLAM HYDROCHLORIDE 2 MG/2ML
1 INJECTION, SOLUTION INTRAMUSCULAR; INTRAVENOUS EVERY 10 MIN PRN
Status: DISCONTINUED | OUTPATIENT
Start: 2022-05-27 | End: 2022-05-27 | Stop reason: HOSPADM

## 2022-05-27 RX ORDER — SODIUM CHLORIDE 9 MG/ML
INJECTION, SOLUTION INTRAVENOUS PRN
Status: DISCONTINUED | OUTPATIENT
Start: 2022-05-27 | End: 2022-05-27

## 2022-05-27 RX ORDER — SODIUM CHLORIDE, SODIUM LACTATE, POTASSIUM CHLORIDE, CALCIUM CHLORIDE 600; 310; 30; 20 MG/100ML; MG/100ML; MG/100ML; MG/100ML
INJECTION, SOLUTION INTRAVENOUS CONTINUOUS
Status: DISCONTINUED | OUTPATIENT
Start: 2022-05-27 | End: 2022-05-27

## 2022-05-27 RX ORDER — ROCURONIUM BROMIDE 10 MG/ML
INJECTION, SOLUTION INTRAVENOUS PRN
Status: DISCONTINUED | OUTPATIENT
Start: 2022-05-27 | End: 2022-05-27 | Stop reason: SDUPTHER

## 2022-05-27 RX ORDER — SODIUM CHLORIDE 0.9 % (FLUSH) 0.9 %
5-40 SYRINGE (ML) INJECTION PRN
Status: DISCONTINUED | OUTPATIENT
Start: 2022-05-27 | End: 2022-05-27 | Stop reason: HOSPADM

## 2022-05-27 RX ORDER — HEPARIN SODIUM 10000 [USP'U]/ML
INJECTION, SOLUTION INTRAVENOUS; SUBCUTANEOUS PRN
Status: DISCONTINUED | OUTPATIENT
Start: 2022-05-27 | End: 2022-05-27 | Stop reason: HOSPADM

## 2022-05-27 RX ORDER — FENTANYL CITRATE 50 UG/ML
INJECTION, SOLUTION INTRAMUSCULAR; INTRAVENOUS
Status: COMPLETED
Start: 2022-05-27 | End: 2022-05-27

## 2022-05-27 RX ORDER — FENTANYL CITRATE 50 UG/ML
50 INJECTION, SOLUTION INTRAMUSCULAR; INTRAVENOUS ONCE
Status: COMPLETED | OUTPATIENT
Start: 2022-05-27 | End: 2022-05-27

## 2022-05-27 RX ORDER — ONDANSETRON 2 MG/ML
INJECTION INTRAMUSCULAR; INTRAVENOUS PRN
Status: DISCONTINUED | OUTPATIENT
Start: 2022-05-27 | End: 2022-05-27 | Stop reason: SDUPTHER

## 2022-05-27 RX ORDER — SODIUM CHLORIDE 9 MG/ML
INJECTION INTRAVENOUS PRN
Status: DISCONTINUED | OUTPATIENT
Start: 2022-05-27 | End: 2022-05-27 | Stop reason: SDUPTHER

## 2022-05-27 RX ADMIN — ROCURONIUM BROMIDE 50 MG: 10 INJECTION INTRAVENOUS at 08:06

## 2022-05-27 RX ADMIN — ROCURONIUM BROMIDE 10 MG: 10 INJECTION INTRAVENOUS at 09:20

## 2022-05-27 RX ADMIN — CARVEDILOL 25 MG: 12.5 TABLET, FILM COATED ORAL at 21:21

## 2022-05-27 RX ADMIN — KETAMINE HYDROCHLORIDE 50 MG: 10 INJECTION INTRAMUSCULAR; INTRAVENOUS at 10:18

## 2022-05-27 RX ADMIN — ACETAMINOPHEN 1000 MG: 325 TABLET ORAL at 21:21

## 2022-05-27 RX ADMIN — LIDOCAINE HYDROCHLORIDE 50 MG: 10 INJECTION, SOLUTION EPIDURAL; INFILTRATION; INTRACAUDAL; PERINEURAL at 07:16

## 2022-05-27 RX ADMIN — ROCURONIUM BROMIDE 10 MG: 10 INJECTION INTRAVENOUS at 08:41

## 2022-05-27 RX ADMIN — WATER 2000 MG: 100 INJECTION, SOLUTION INTRAVENOUS at 16:24

## 2022-05-27 RX ADMIN — ALBUTEROL SULFATE 4 PUFF: 90 AEROSOL, METERED RESPIRATORY (INHALATION) at 07:25

## 2022-05-27 RX ADMIN — METHOCARBAMOL TABLETS 750 MG: 750 TABLET, COATED ORAL at 05:10

## 2022-05-27 RX ADMIN — PHENYLEPHRINE HYDROCHLORIDE 100 MCG: 10 INJECTION INTRAVENOUS at 10:26

## 2022-05-27 RX ADMIN — SODIUM CHLORIDE, POTASSIUM CHLORIDE, SODIUM LACTATE AND CALCIUM CHLORIDE: 600; 310; 30; 20 INJECTION, SOLUTION INTRAVENOUS at 06:45

## 2022-05-27 RX ADMIN — MIDAZOLAM HYDROCHLORIDE 2 MG: 1 INJECTION, SOLUTION INTRAMUSCULAR; INTRAVENOUS at 07:13

## 2022-05-27 RX ADMIN — SODIUM CHLORIDE: 9 INJECTION, SOLUTION INTRAVENOUS at 07:25

## 2022-05-27 RX ADMIN — LOSARTAN POTASSIUM 50 MG: 50 TABLET, FILM COATED ORAL at 22:12

## 2022-05-27 RX ADMIN — OXYCODONE 5 MG: 5 TABLET ORAL at 22:12

## 2022-05-27 RX ADMIN — ROCURONIUM BROMIDE 50 MG: 10 INJECTION INTRAVENOUS at 07:17

## 2022-05-27 RX ADMIN — Medication 2 G: at 07:45

## 2022-05-27 RX ADMIN — KETAMINE HYDROCHLORIDE 25 MG: 10 INJECTION INTRAMUSCULAR; INTRAVENOUS at 09:11

## 2022-05-27 RX ADMIN — ONDANSETRON 4 MG: 2 INJECTION INTRAMUSCULAR; INTRAVENOUS at 11:47

## 2022-05-27 RX ADMIN — METHOCARBAMOL TABLETS 750 MG: 750 TABLET, COATED ORAL at 22:12

## 2022-05-27 RX ADMIN — ROCURONIUM BROMIDE 10 MG: 10 INJECTION INTRAVENOUS at 09:01

## 2022-05-27 RX ADMIN — ROCURONIUM BROMIDE 20 MG: 10 INJECTION INTRAVENOUS at 10:45

## 2022-05-27 RX ADMIN — OXYCODONE 5 MG: 5 TABLET ORAL at 03:16

## 2022-05-27 RX ADMIN — ROCURONIUM BROMIDE 20 MG: 10 INJECTION INTRAVENOUS at 11:18

## 2022-05-27 RX ADMIN — SODIUM CHLORIDE: 9 INJECTION, SOLUTION INTRAVENOUS at 11:59

## 2022-05-27 RX ADMIN — PROPOFOL 200 MG: 10 INJECTION, EMULSION INTRAVENOUS at 07:16

## 2022-05-27 RX ADMIN — FENTANYL CITRATE 50 MCG: 50 INJECTION, SOLUTION INTRAMUSCULAR; INTRAVENOUS at 09:11

## 2022-05-27 RX ADMIN — FENTANYL CITRATE 100 MCG: 50 INJECTION, SOLUTION INTRAMUSCULAR; INTRAVENOUS at 07:16

## 2022-05-27 RX ADMIN — ROCURONIUM BROMIDE 20 MG: 10 INJECTION INTRAVENOUS at 09:45

## 2022-05-27 RX ADMIN — GABAPENTIN 300 MG: 300 CAPSULE ORAL at 05:10

## 2022-05-27 RX ADMIN — KETAMINE HYDROCHLORIDE 50 MG: 10 INJECTION INTRAMUSCULAR; INTRAVENOUS at 12:01

## 2022-05-27 RX ADMIN — SUGAMMADEX 300 MG: 100 INJECTION, SOLUTION INTRAVENOUS at 11:54

## 2022-05-27 RX ADMIN — FENTANYL CITRATE 100 MCG: 50 INJECTION, SOLUTION INTRAMUSCULAR; INTRAVENOUS at 07:37

## 2022-05-27 RX ADMIN — PHENYLEPHRINE HYDROCHLORIDE 25 MCG/MIN: 10 INJECTION INTRAVENOUS at 08:36

## 2022-05-27 RX ADMIN — FENTANYL CITRATE 100 MCG: 50 INJECTION, SOLUTION INTRAMUSCULAR; INTRAVENOUS at 06:45

## 2022-05-27 RX ADMIN — FENTANYL CITRATE 100 MCG: 50 INJECTION INTRAMUSCULAR; INTRAVENOUS at 06:45

## 2022-05-27 RX ADMIN — OXYCODONE 5 MG: 5 TABLET ORAL at 16:36

## 2022-05-27 RX ADMIN — KETAMINE HYDROCHLORIDE 25 MG: 10 INJECTION INTRAMUSCULAR; INTRAVENOUS at 08:50

## 2022-05-27 RX ADMIN — PHENYLEPHRINE HYDROCHLORIDE 100 MCG: 10 INJECTION INTRAVENOUS at 08:35

## 2022-05-27 RX ADMIN — GABAPENTIN 300 MG: 300 CAPSULE ORAL at 22:13

## 2022-05-27 RX ADMIN — SODIUM CHLORIDE 9 ML: 9 INJECTION, SOLUTION INTRAMUSCULAR; INTRAVENOUS; SUBCUTANEOUS at 08:35

## 2022-05-27 RX ADMIN — DEXAMETHASONE SODIUM PHOSPHATE 10 MG: 10 INJECTION INTRAMUSCULAR; INTRAVENOUS at 08:02

## 2022-05-27 RX ADMIN — SODIUM CHLORIDE: 9 INJECTION, SOLUTION INTRAVENOUS at 09:46

## 2022-05-27 RX ADMIN — ACETAMINOPHEN 1000 MG: 325 TABLET ORAL at 05:10

## 2022-05-27 RX ADMIN — Medication 2 G: at 11:40

## 2022-05-27 RX ADMIN — KETAMINE HYDROCHLORIDE 50 MG: 10 INJECTION INTRAMUSCULAR; INTRAVENOUS at 08:02

## 2022-05-27 ASSESSMENT — PAIN SCALES - WONG BAKER
WONGBAKER_NUMERICALRESPONSE: 0

## 2022-05-27 ASSESSMENT — PAIN SCALES - GENERAL
PAINLEVEL_OUTOF10: 6
PAINLEVEL_OUTOF10: 8
PAINLEVEL_OUTOF10: 4
PAINLEVEL_OUTOF10: 7
PAINLEVEL_OUTOF10: 8
PAINLEVEL_OUTOF10: 8
PAINLEVEL_OUTOF10: 0

## 2022-05-27 ASSESSMENT — PAIN DESCRIPTION - LOCATION
LOCATION: HIP

## 2022-05-27 ASSESSMENT — PAIN DESCRIPTION - DESCRIPTORS: DESCRIPTORS: SHARP;THROBBING
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
## 2022-05-27 NOTE — PLAN OF CARE
Patient:  Isaac Martin  YOB: 1975     48 y.o. male    Orthopedic post-operative instructions    Isaac Martin is a 48 y.o. male who is s/p right posterior wall/posterior column acetabulum ORIF, POD#0:    - Weightbearing status: Toe touch weightbearing to the right lower extremity   - Two drains in place. Record drain output each nursing shift. Plan for ortho to pull drains once drain output < 30 mL per shift.   - Complete post-op antibiotics  - Keep dressings clean, dry, and intact. Ok to reinforce per nursing. Contact ortho if saturated. - OK for showering once drains are out. Do not soak in a tub or bath. - Diet: OK to advance diet as tolerated from orthopedics perspective   - Strict ice and elevation for pain/swelling  - DVT ppx: Ok to start chemical AC on POD#1  - Pain control PO/IV Medication. Attempt to Wean IV medications.   - Recommended orthopedics pain protocol is as follows:    - Acetaminophen 1000mg tablet every 6 hours    - Naproxen 500mg tablet every 12 hours    - Gabapentin 100 mg tablet TID    - Cyclobenzaprine 10 mg tablet every 6 hours    - Oxycodone 5mg tablet, 1-2 tablets every 4-6 hours    - Docusate 100 mg tablet BID     - Encourage Incentive Spirometry use  - Work with PT/OT for mobilization  - Follow up with Dr. Aguila Perkins in 10-14 days.  Appointment is scheduled for 6-10 at 9:40 AM   - Please page ortho with any questions    Nedra Larson, DO  PGY-2, Department of [facility], [place], [place]  12:37 PM 5/27/2022

## 2022-05-27 NOTE — PROGRESS NOTES
Corrected documentation, Fentanyl 50 mcg dose given on 5/27/22 at 0645 not 100 mcs as previously charted

## 2022-05-27 NOTE — PROGRESS NOTES
Physical Therapy        Physical Therapy Cancel Note      DATE: 2022    NAME: Conine English  MRN: [id number]   : 1975      Patient not seen this date for Physical Therapy due to:    Surgery/Procedure: OR today with ortho. PT will check back for post-op needs.        Electronically signed by Jasmina Torres PT on 2022 at 3:02 PM

## 2022-05-27 NOTE — BRIEF OP NOTE
Brief Postoperative Note      Patient: Mathew Garcia  YOB: 1975  MRN: [id number]   CSN: [id number]     Date of Procedure: 5/27/2022    Pre-Op Diagnosis: RIGHT POSTERIOR WALL/POSTERIOR COLUMN ACETABULUM FRACTURE    Post-Op Diagnosis: RIGHT POSTERIOR WALL/POSTERIOR COLUMN ACETABULUM FRACTURE       Procedure(s):  ORIF Right posterior wall/posterior column acetabulum fx (CPT 18633)    Surgeon(s):  Jeffrey Bullock DO    Assistant:  Resident: Ivania Martínez DO; Edyta Hackett DO; Anand Dove DO    Anesthesia: General    Estimated Blood Loss (mL): 1250 mL     Cell Saver Return: 750 mL     Fluids: 3100 mL crystalloid     Urine: 386 mL     Complications: None    Specimens:   * No specimens in log *    Implants:  Implant Name Type Inv. Item Serial No.  Lot No. LRB No. Used Action   GRAFT BNE SUB 5CC CA PHSPTE DRILLABLE FAST SET PTTY NORIAN - [id number]  GRAFT BNE SUB 5CC CA PHSPTE DRILLABLE FAST SET PTTY NORIAN  DEPUY [employer] [id number] Right 1 Implanted   SCREW BNE L40MM DIA3. 5MM STD NISHI S STL ST NONCANNULATED - [id number]  SCREW BNE L40MM DIA3. 5MM STD NISHI S STL ST NONCANNULATED  DEPUY [employer]WD  Right 1 Implanted   SCREW BNE L45MM DIA3. 5MM STD NISHI S STL ST NONCANNULATED - [id number]  SCREW BNE L45MM DIA3. 5MM STD NISHI S STL ST NONCANNULATED  DEPUY [employer]WD  Right 1 Implanted   SCREW BNE L50MM DIA3. 5MM STD NISHI S STL ST NONCANNULATED - [id number]  SCREW BNE L50MM DIA3. 5MM STD NISHI S STL ST NONCANNULATED  DEPUY [employer]WD  Right 3 Implanted   SCREW BNE L75MM DIA3.5MM NISHI S STL ST FULL THRD SM HEX - [id number]  SCREW BNE L75MM DIA3.5MM NISHI S STL ST FULL THRD SM HEX  DEPUY [employer]  Right 1 Implanted   SCREW BNE L26MM DIA3.5MM NISHI S STL ST NONCANNULATED SANTA - [id number]  SCREW BNE L26MM DIA3.5MM NISHI S STL ST NONCANNULATED SANTA  DEPUY [employer]-WD  Right 2 Implanted   SCREW BNE L30MM DIA3.5MM NISHI S STL ST NONCANNULATED SANTA - [id number]  SCREW BNE L30MM DIA3.5MM NISHI S STL ST NONCANNULATED SANTA  [employer]-  Right 2 Implanted   PLATE BNE [id number] THK2.7MM 6 H BILAT S STL STR LO PROF - [id number]  PLATE BNE [id number] THK2.7MM 6 H BILAT S STL STR LO PROF  DEPUY [employer]-  Right 1 Implanted   PLATE BNE [id number] THK2.7MM 8 H BILAT S STL STR LO PROF - [id number]  PLATE BNE [id number] THK2.7MM 8 H BILAT S STL STR LO PROF  [employer] [place]WD  Right 2 Implanted         Drains:   Closed/Suction Drain Right Hip Accordion (Active)       Closed/Suction Drain Right Buttock; Hip Accordion (Active)       Urinary Catheter Retana (Active)       Findings: See op note for details.      Electronically signed by Nereida Sandhoff, DO on 5/27/2022 at 12:20 PM
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
## 2022-05-27 NOTE — ANESTHESIA POSTPROCEDURE EVALUATION
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 Vaishali Phase I: Vaishali Score: 8    Vaishali Phase II:      Last vitals: Reviewed and per EMR flowsheets.        Anesthesia Post Evaluation    Patient location during evaluation: PACU  Patient participation: complete - patient participated  Level of consciousness: awake and alert  Pain score: 3  Airway patency: patent  Nausea & Vomiting: no nausea and no vomiting  Complications: no  Cardiovascular status: hemodynamically stable  Respiratory status: acceptable  Hydration status: stable
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
## 2022-05-28 LAB
ABSOLUTE EOS #: <0.03 K/UL (ref 0–0.44)
ABSOLUTE IMMATURE GRANULOCYTE: 0.04 K/UL (ref 0–0.3)
ABSOLUTE LYMPH #: 1.41 K/UL (ref 1.1–3.7)
ABSOLUTE MONO #: 1.37 K/UL (ref 0.1–1.2)
ANION GAP SERPL CALCULATED.3IONS-SCNC: 12 MMOL/L (ref 9–17)
BASOPHILS # BLD: 0 % (ref 0–2)
BASOPHILS ABSOLUTE: <0.03 K/UL (ref 0–0.2)
BUN BLDV-MCNC: 18 MG/DL (ref 6–20)
CALCIUM SERPL-MCNC: 7.7 MG/DL (ref 8.6–10.4)
CHLORIDE BLD-SCNC: 101 MMOL/L (ref 98–107)
CO2: 20 MMOL/L (ref 20–31)
CREAT SERPL-MCNC: 1.22 MG/DL (ref 0.7–1.2)
EOSINOPHILS RELATIVE PERCENT: 0 % (ref 1–4)
GFR AFRICAN AMERICAN: >60 ML/MIN
GFR NON-AFRICAN AMERICAN: >60 ML/MIN
GFR SERPL CREATININE-BSD FRML MDRD: ABNORMAL ML/MIN/{1.73_M2}
GLUCOSE BLD-MCNC: 140 MG/DL (ref 70–99)
HCT VFR BLD CALC: 28.8 % (ref 40.7–50.3)
HEMOGLOBIN: 9.4 G/DL (ref 13–17)
IMMATURE GRANULOCYTES: 0 %
LYMPHOCYTES # BLD: 12 % (ref 24–43)
MCH RBC QN AUTO: 28.7 PG (ref 25.2–33.5)
MCHC RBC AUTO-ENTMCNC: 32.6 G/DL (ref 28.4–34.8)
MCV RBC AUTO: 87.8 FL (ref 82.6–102.9)
MONOCYTES # BLD: 12 % (ref 3–12)
NRBC AUTOMATED: 0 PER 100 WBC
PDW BLD-RTO: 14.2 % (ref 11.8–14.4)
PLATELET # BLD: 156 K/UL (ref 138–453)
PMV BLD AUTO: 9.1 FL (ref 8.1–13.5)
POTASSIUM SERPL-SCNC: 4.2 MMOL/L (ref 3.7–5.3)
RBC # BLD: 3.28 M/UL (ref 4.21–5.77)
SEG NEUTROPHILS: 76 % (ref 36–65)
SEGMENTED NEUTROPHILS ABSOLUTE COUNT: 8.93 K/UL (ref 1.5–8.1)
SODIUM BLD-SCNC: 133 MMOL/L (ref 135–144)
WBC # BLD: 11.8 K/UL (ref 3.5–11.3)

## 2022-05-28 PROCEDURE — 6370000000 HC RX 637 (ALT 250 FOR IP): Performed by: STUDENT IN AN ORGANIZED HEALTH CARE EDUCATION/TRAINING PROGRAM

## 2022-05-28 PROCEDURE — 80048 BASIC METABOLIC PNL TOTAL CA: CPT

## 2022-05-28 PROCEDURE — 6360000002 HC RX W HCPCS: Performed by: STUDENT IN AN ORGANIZED HEALTH CARE EDUCATION/TRAINING PROGRAM

## 2022-05-28 PROCEDURE — 97530 THERAPEUTIC ACTIVITIES: CPT

## 2022-05-28 PROCEDURE — 97535 SELF CARE MNGMENT TRAINING: CPT

## 2022-05-28 PROCEDURE — 2060000000 HC ICU INTERMEDIATE R&B

## 2022-05-28 PROCEDURE — 2500000003 HC RX 250 WO HCPCS: Performed by: STUDENT IN AN ORGANIZED HEALTH CARE EDUCATION/TRAINING PROGRAM

## 2022-05-28 PROCEDURE — 97162 PT EVAL MOD COMPLEX 30 MIN: CPT

## 2022-05-28 PROCEDURE — 36415 COLL VENOUS BLD VENIPUNCTURE: CPT

## 2022-05-28 PROCEDURE — 97166 OT EVAL MOD COMPLEX 45 MIN: CPT

## 2022-05-28 PROCEDURE — 85025 COMPLETE CBC W/AUTO DIFF WBC: CPT

## 2022-05-28 RX ORDER — ENOXAPARIN SODIUM 100 MG/ML
30 INJECTION SUBCUTANEOUS 2 TIMES DAILY
Status: DISCONTINUED | OUTPATIENT
Start: 2022-05-28 | End: 2022-06-02 | Stop reason: HOSPADM

## 2022-05-28 RX ADMIN — GABAPENTIN 300 MG: 300 CAPSULE ORAL at 06:43

## 2022-05-28 RX ADMIN — POLYETHYLENE GLYCOL 3350 17 G: 17 POWDER, FOR SOLUTION ORAL at 08:43

## 2022-05-28 RX ADMIN — OXYCODONE 5 MG: 5 TABLET ORAL at 17:42

## 2022-05-28 RX ADMIN — ENOXAPARIN SODIUM 30 MG: 100 INJECTION SUBCUTANEOUS at 21:24

## 2022-05-28 RX ADMIN — WATER 2000 MG: 100 INJECTION, SOLUTION INTRAVENOUS at 00:22

## 2022-05-28 RX ADMIN — AMLODIPINE BESYLATE 10 MG: 10 TABLET ORAL at 08:34

## 2022-05-28 RX ADMIN — ACETAMINOPHEN 1000 MG: 325 TABLET ORAL at 13:35

## 2022-05-28 RX ADMIN — METHOCARBAMOL TABLETS 750 MG: 750 TABLET, COATED ORAL at 21:25

## 2022-05-28 RX ADMIN — LOSARTAN POTASSIUM 50 MG: 50 TABLET, FILM COATED ORAL at 08:34

## 2022-05-28 RX ADMIN — LOSARTAN POTASSIUM 50 MG: 50 TABLET, FILM COATED ORAL at 21:24

## 2022-05-28 RX ADMIN — ACETAMINOPHEN 1000 MG: 325 TABLET ORAL at 21:24

## 2022-05-28 RX ADMIN — GABAPENTIN 300 MG: 300 CAPSULE ORAL at 13:35

## 2022-05-28 RX ADMIN — METHOCARBAMOL TABLETS 750 MG: 750 TABLET, COATED ORAL at 16:21

## 2022-05-28 RX ADMIN — GABAPENTIN 300 MG: 300 CAPSULE ORAL at 21:24

## 2022-05-28 RX ADMIN — METHOCARBAMOL TABLETS 750 MG: 750 TABLET, COATED ORAL at 04:32

## 2022-05-28 RX ADMIN — CARVEDILOL 25 MG: 12.5 TABLET, FILM COATED ORAL at 08:35

## 2022-05-28 RX ADMIN — CARVEDILOL 25 MG: 12.5 TABLET, FILM COATED ORAL at 16:21

## 2022-05-28 RX ADMIN — METHOCARBAMOL TABLETS 750 MG: 750 TABLET, COATED ORAL at 09:42

## 2022-05-28 RX ADMIN — OXYCODONE 5 MG: 5 TABLET ORAL at 05:38

## 2022-05-28 RX ADMIN — HYDROCHLOROTHIAZIDE 25 MG: 25 TABLET ORAL at 08:34

## 2022-05-28 RX ADMIN — ACETAMINOPHEN 1000 MG: 325 TABLET ORAL at 05:38

## 2022-05-28 RX ADMIN — OXYCODONE 5 MG: 5 TABLET ORAL at 11:40

## 2022-05-28 ASSESSMENT — PAIN DESCRIPTION - PAIN TYPE
TYPE: ACUTE PAIN;SURGICAL PAIN

## 2022-05-28 ASSESSMENT — PAIN SCALES - GENERAL
PAINLEVEL_OUTOF10: 7
PAINLEVEL_OUTOF10: 6
PAINLEVEL_OUTOF10: 7
PAINLEVEL_OUTOF10: 6
PAINLEVEL_OUTOF10: 8
PAINLEVEL_OUTOF10: 6
PAINLEVEL_OUTOF10: 6
PAINLEVEL_OUTOF10: 5
PAINLEVEL_OUTOF10: 4
PAINLEVEL_OUTOF10: 6
PAINLEVEL_OUTOF10: 5
PAINLEVEL_OUTOF10: 5
PAINLEVEL_OUTOF10: 6
PAINLEVEL_OUTOF10: 6
PAINLEVEL_OUTOF10: 7
PAINLEVEL_OUTOF10: 6
PAINLEVEL_OUTOF10: 7
PAINLEVEL_OUTOF10: 5

## 2022-05-28 ASSESSMENT — PAIN DESCRIPTION - LOCATION
LOCATION: HIP

## 2022-05-28 ASSESSMENT — PAIN DESCRIPTION - DESCRIPTORS
DESCRIPTORS: SHARP;THROBBING

## 2022-05-28 ASSESSMENT — PAIN - FUNCTIONAL ASSESSMENT
PAIN_FUNCTIONAL_ASSESSMENT: PREVENTS OR INTERFERES SOME ACTIVE ACTIVITIES AND ADLS

## 2022-05-28 ASSESSMENT — PAIN DESCRIPTION - ORIENTATION
ORIENTATION: RIGHT

## 2022-05-28 ASSESSMENT — PAIN DESCRIPTION - FREQUENCY
FREQUENCY: CONTINUOUS

## 2022-05-28 ASSESSMENT — PAIN DESCRIPTION - ONSET
ONSET: ON-GOING
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
## 2022-05-28 NOTE — PLAN OF CARE
Problem: Discharge Planning  Goal: Discharge to home or other facility with appropriate resources  5/28/2022 1531 by Melissa Hurt RN  Outcome: Progressing  5/28/2022 0546 by Mariana Conner RN  Outcome: Progressing  5/28/2022 0546 by Mariana Conner RN  Outcome: Progressing     Problem: Skin/Tissue Integrity  Goal: Absence of new skin breakdown  Description: 1. Monitor for areas of redness and/or skin breakdown  2. Assess vascular access sites hourly  3. Every 4-6 hours minimum:  Change oxygen saturation probe site  4. Every 4-6 hours:  If on nasal continuous positive airway pressure, respiratory therapy assess nares and determine need for appliance change or resting period.   5/28/2022 0546 by Mariana Conner RN  Outcome: Progressing  5/28/2022 0546 by Mariana Conner RN  Outcome: Progressing     Problem: ABCDS Injury Assessment  Goal: Absence of physical injury  5/28/2022 0546 by Mariana Conner RN  Outcome: Progressing  5/28/2022 0546 by Mariana Conner RN  Outcome: Progressing     Problem: Safety - Adult  Goal: Free from fall injury  5/28/2022 1531 by Melissa Hurt RN  Outcome: Progressing  5/28/2022 0546 by Mariana Conner RN  Outcome: Progressing  5/28/2022 0546 by Mariana Conner RN  Outcome: Progressing     Problem: Pain  Goal: Verbalizes/displays adequate comfort level or baseline comfort level  5/28/2022 1531 by Melissa Hurt RN  Outcome: Progressing  5/28/2022 0546 by Mariana Conner RN  Outcome: Progressing  5/28/2022 0546 by Mariana Conner RN  Outcome: Progressing
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
## 2022-05-28 NOTE — PROGRESS NOTES
Orthopedic Progress Note    Patient:  Merry Eisenberg  YOB: 1975     48 y.o. male    Subjective:  Patient seen and examined  No complaints or concerns  No issue overnight  Pain controlled  Denies fever, HA, CP, SOB    Vitals reviewed, afebrile    Objective:   Vitals:    05/28/22 0754   BP: (!) 154/92   Pulse: 73   Resp: 17   Temp: 98.6 °F (37 °C)   SpO2: 96%     Gen: NAD, cooperative     Cardiovascular: regular rate, no dependent edema    Respiratory: No acute respiratory distress    RLE: Hemovac drains in place; proximal/distal 140/85 cc of serosanguinous fluid respectively since surgery. Dressings c/d/i. Compartments soft. 2+ DP pulse. TA/EHL/FHL/GS motor intact. Deep and Superficial Peroneal/Saphenous/Sural SILT. Recent Labs     05/25/22  1710 05/25/22  1710 05/27/22  1652 05/28/22  0715 05/28/22  0754   WBC 5.1  --   --  11.8*  --    HGB 12.4*  --    < > 9.4*  --    HCT 36.9*  --    < > 28.8*  --      --   --  156  --    INR 1.0  --   --   --   --       < >  --   --  133*   K 3.9   < >  --   --  4.2   BUN 15   < >  --   --  18   CREATININE 0.97   < >  --   --  1.22*   GLUCOSE 119*   < >  --   --  140*    < > = values in this interval not displayed. Meds: ancef   See rec for complete list    Impression 48 y.o. male being seen for the following orthopaedic injuries    - Right posterior wall/posterior column acetabulum ORIF, POD1    Plan    - Weightbearing status: TTWB to RLE  - Two drains in place. Record drain output each nursing shift.   - Complete post-op antibiotics  - Keep dressings clean, dry, and intact.  Ok to reinforce prn  - Ice right hip for pain/swelling  - OK for chemical AC POD1  - Encourage IS and mobilization with PT/OT  - No further plans for surgery per ortho; OK to dc once medically stable  - Please page ortho with any questions    Electronically signed by Jolan MediaDO 9:05 AM 5/28/2022

## 2022-05-28 NOTE — PLAN OF CARE
Problem: Discharge Planning  Goal: Discharge to home or other facility with appropriate resources  5/28/2022 0546 by Shonna Ribera RN  Outcome: Progressing  5/28/2022 0546 by Shonna Ribera RN  Outcome: Progressing  5/27/2022 1804 by Mich Brian RN  Outcome: Progressing     Problem: Skin/Tissue Integrity  Goal: Absence of new skin breakdown  Description: 1. Monitor for areas of redness and/or skin breakdown  2. Assess vascular access sites hourly  3. Every 4-6 hours minimum:  Change oxygen saturation probe site  4. Every 4-6 hours:  If on nasal continuous positive airway pressure, respiratory therapy assess nares and determine need for appliance change or resting period.   5/28/2022 0546 by Shonna Ribera RN  Outcome: Progressing  5/28/2022 0546 by Shonna Ribera RN  Outcome: Progressing     Problem: ABCDS Injury Assessment  Goal: Absence of physical injury  5/28/2022 0546 by Shonna Ribera RN  Outcome: Progressing  5/28/2022 0546 by Shonna Ribera RN  Outcome: Progressing     Problem: Safety - Adult  Goal: Free from fall injury  5/28/2022 0546 by Shonna Ribera RN  Outcome: Progressing  5/28/2022 0546 by Shonna Ribera RN  Outcome: Progressing  5/27/2022 1804 by Mich Brian RN  Outcome: Progressing     Problem: Pain  Goal: Verbalizes/displays adequate comfort level or baseline comfort level  5/28/2022 0546 by Shonna Ribera RN  Outcome: Progressing  5/28/2022 0546 by Shonna Ribera RN  Outcome: Progressing  5/27/2022 1804 by Mich Brian RN  Outcome: Progressing

## 2022-05-29 PROCEDURE — 6370000000 HC RX 637 (ALT 250 FOR IP): Performed by: STUDENT IN AN ORGANIZED HEALTH CARE EDUCATION/TRAINING PROGRAM

## 2022-05-29 PROCEDURE — 6360000002 HC RX W HCPCS: Performed by: STUDENT IN AN ORGANIZED HEALTH CARE EDUCATION/TRAINING PROGRAM

## 2022-05-29 PROCEDURE — 2060000000 HC ICU INTERMEDIATE R&B

## 2022-05-29 RX ADMIN — OXYCODONE 5 MG: 5 TABLET ORAL at 03:04

## 2022-05-29 RX ADMIN — LOSARTAN POTASSIUM 50 MG: 50 TABLET, FILM COATED ORAL at 08:37

## 2022-05-29 RX ADMIN — CARVEDILOL 25 MG: 12.5 TABLET, FILM COATED ORAL at 08:38

## 2022-05-29 RX ADMIN — METHOCARBAMOL TABLETS 750 MG: 750 TABLET, COATED ORAL at 20:57

## 2022-05-29 RX ADMIN — OXYCODONE 5 MG: 5 TABLET ORAL at 17:00

## 2022-05-29 RX ADMIN — METHOCARBAMOL TABLETS 750 MG: 750 TABLET, COATED ORAL at 15:12

## 2022-05-29 RX ADMIN — AMLODIPINE BESYLATE 10 MG: 10 TABLET ORAL at 08:37

## 2022-05-29 RX ADMIN — ENOXAPARIN SODIUM 30 MG: 100 INJECTION SUBCUTANEOUS at 08:37

## 2022-05-29 RX ADMIN — CARVEDILOL 25 MG: 12.5 TABLET, FILM COATED ORAL at 17:00

## 2022-05-29 RX ADMIN — LOSARTAN POTASSIUM 50 MG: 50 TABLET, FILM COATED ORAL at 20:53

## 2022-05-29 RX ADMIN — GABAPENTIN 300 MG: 300 CAPSULE ORAL at 15:12

## 2022-05-29 RX ADMIN — GABAPENTIN 300 MG: 300 CAPSULE ORAL at 20:53

## 2022-05-29 RX ADMIN — OXYCODONE 5 MG: 5 TABLET ORAL at 09:21

## 2022-05-29 RX ADMIN — OXYCODONE 5 MG: 5 TABLET ORAL at 23:42

## 2022-05-29 RX ADMIN — POLYETHYLENE GLYCOL 3350 17 G: 17 POWDER, FOR SOLUTION ORAL at 08:37

## 2022-05-29 RX ADMIN — GABAPENTIN 300 MG: 300 CAPSULE ORAL at 06:09

## 2022-05-29 RX ADMIN — METHOCARBAMOL TABLETS 750 MG: 750 TABLET, COATED ORAL at 05:03

## 2022-05-29 RX ADMIN — ACETAMINOPHEN 1000 MG: 325 TABLET ORAL at 20:54

## 2022-05-29 RX ADMIN — METHOCARBAMOL TABLETS 750 MG: 750 TABLET, COATED ORAL at 10:19

## 2022-05-29 RX ADMIN — ENOXAPARIN SODIUM 30 MG: 100 INJECTION SUBCUTANEOUS at 20:54

## 2022-05-29 RX ADMIN — ACETAMINOPHEN 1000 MG: 325 TABLET ORAL at 15:11

## 2022-05-29 RX ADMIN — HYDROCHLOROTHIAZIDE 25 MG: 25 TABLET ORAL at 08:37

## 2022-05-29 RX ADMIN — ACETAMINOPHEN 1000 MG: 325 TABLET ORAL at 06:09

## 2022-05-29 ASSESSMENT — PAIN - FUNCTIONAL ASSESSMENT
PAIN_FUNCTIONAL_ASSESSMENT: PREVENTS OR INTERFERES SOME ACTIVE ACTIVITIES AND ADLS
PAIN_FUNCTIONAL_ASSESSMENT: PREVENTS OR INTERFERES SOME ACTIVE ACTIVITIES AND ADLS

## 2022-05-29 ASSESSMENT — PAIN SCALES - GENERAL
PAINLEVEL_OUTOF10: 9
PAINLEVEL_OUTOF10: 7
PAINLEVEL_OUTOF10: 8
PAINLEVEL_OUTOF10: 7
PAINLEVEL_OUTOF10: 8
PAINLEVEL_OUTOF10: 9
PAINLEVEL_OUTOF10: 7
PAINLEVEL_OUTOF10: 7

## 2022-05-29 ASSESSMENT — PAIN DESCRIPTION - DESCRIPTORS
DESCRIPTORS: SHARP;THROBBING
DESCRIPTORS: SHARP;THROBBING

## 2022-05-29 ASSESSMENT — PAIN DESCRIPTION - ORIENTATION
ORIENTATION: RIGHT
ORIENTATION: RIGHT

## 2022-05-29 ASSESSMENT — PAIN DESCRIPTION - LOCATION
LOCATION: HIP
LOCATION: HIP

## 2022-05-29 ASSESSMENT — PAIN DESCRIPTION - PAIN TYPE
TYPE: ACUTE PAIN;SURGICAL PAIN
TYPE: ACUTE PAIN;SURGICAL PAIN

## 2022-05-29 ASSESSMENT — PAIN DESCRIPTION - FREQUENCY
FREQUENCY: CONTINUOUS
FREQUENCY: CONTINUOUS

## 2022-05-29 ASSESSMENT — PAIN DESCRIPTION - ONSET
ONSET: ON-GOING
ONSET: ON-GOING

## 2022-05-29 NOTE — OP NOTE
[facility]                                OPERATIVE REPORT    PATIENT NAME: Beka Donnelly                    :        1975  MED REC NO:   [id number]                             ROOM:       0139  ACCOUNT NO:   [de-identified]                           ADMIT DATE: 2022  PROVIDER:     Mare Justin    DATE OF PROCEDURE:  2022    PREOPERATIVE DIAGNOSIS:  Right posterior wall and posterior column  acetabulum fracture. POSTOPERATIVE DIAGNOSIS:  Right posterior wall and posterior column  acetabulum fracture. PROCEDURE:  Open reduction and internal fixation of right posterior wall  and posterior column acetabulum fracture, CPT 98132. ATTENDING SURGEON:  Mare Justin DO    ASSISTANTS:  [facility],   2.  Sagrario Khoury DO, PGY-1    ANESTHESIA:  General.    ESTIMATED BLOOD LOSS:  1250 mL    IV FLUIDS:  3100 mL of crystalloid and 750 mL of autotransfusion. COMPLICATIONS:  None. SPECIMENS:  None. IMPLANTS:  1. Synthes 3.5-mm pelvic recon plate x2.  2.  5 mL of Synthes Norian cement. INDICATIONS:  The patient is a 22-year-old male, who presented with a  right posterior wall and posterior column acetabulum fracture that met  operative indications. The injury as well as the planned surgical  intervention and the postoperative recovery were discussed with the  patient in great detail. After questions and concerns were addressed to  his satisfaction, written informed consent was obtained and his  operative site was marked. OPERATIVE PROCEDURE:  The patient was transported to the operating room. General anesthesia was administered by the anesthesia providers without  complication. He was transferred to a radiolucent flat-top table in a  prone position over a blanket and chest rolls.   Upper extremities were  appropriately positioned and all bony prominences were well padded and  the patient was adequately secured to the bed. We confirmed with the  anesthesia providers that they had adequate access to the airway and IV  lines before proceeding. A Retana catheter was placed. The peritoneum  was cleansed with alcohol and the right lower extremity was prepped and  draped in the standard sterile fashion. A time-out was performed that  included all involved parties in correctly identifying the patient,  planned procedure, and operative site. After everyone agreed, we  continued. We completed a standard Kocher-Langenbeck approach to access  the posterior aspect of the acetabulum. The obturator internus and  piriformis tendons were transected and tagged with #2 FiberWire that was  later used for the repair of these tendons at the conclusion of the  procedure. The sciatic nerve was bluntly dissected free and mobilized  in order to be protected throughout the procedure. Hematoma was  evacuated and the fracture bed was cleaned. There were several loose  cortical fragments of the posterior wall that were placed in the back  table and saved. There were also multiple loose osteochondral fragments  that were also cleansed and saved. The posterior column fragment was  displaced in order to allow access to the quadrilateral plate fragment. I took a considerable amount of time to mobilize this piece as it was  quite impacted into the surrounding anterior and posterior columns. Eventually, it was able to be freed and mobilized and provisionally  pinned after reduction. I placed a 3.5-mm cortical screw in a lag-by  technique fashion from the intact proximal portion of the posterior  column into this fragment. We were then able to reduce the main  posterior column fragment. We made our best efforts to anatomically  reduce the free osteochondral fragments at this time using the femoral  head for the template. Once they were in place, the cortical fragments  were provisionally reduced. Once we had an appropriate understanding of  their correct positioning, they were removed and the remaining  metaphyseal void was filled with 5 mL of fast-set Norian calcium  phosphate putty and the cortical pieces of the posterior wall were  returned to their native positions. Multiple K-wires were placed to  hold this overall reduction. We began by a plate to fix the posterior  column. An 8-hole Synthes 3.5-mm pelvic recon plate was contoured to  fit in a more central position along the posterior wall and posterior  column and was fixed. It was under contoured in order to provide an  appropriate buttress effect to fix the two cortical screws in ischial  tuberosity and the plate was then reduced to bone and fixed with three  additional screws in the proximal aspect of the plate which closely  approximated the plate and provided adequate fixation. An additional  plate was placed more peripheral along the posterior wall in order to  provide balanced fixation over the segments. This plate was applied in  a similar fashion as described for the initial more central plate. This  allowed all provisional fixations to be removed. The fracture was felt  to be well-secured at this point. The hip was taken through a passive  range of motion and felt no indications of instability or crepitance to  suggest hardware displaced within the joint. Now, we then took multiple  fluoroscopic images in the AP and Judet views which confirmed a  concentrically reduced hip and safe and appropriate implant placements  at that time. We then copiously irrigated the wound with 3 liters of  warmed normal saline solution via pulse lavage system. We repaired the  piriformis and obturator internus tendons. A deep drain was placed  through the greater sciatic notch which exited the anterolateral aspect  of the thigh. We closed the deep layer with heavy absorbable suture.    An additional drain was placed in the subcutaneous tissue which exited  just distal to the initial drain in the anterolateral thigh. The  remaining subcutaneous tissues were closed with absorbable suture and  the skin approximated with staples. The field was cleaned and dried. Sterile bandages were applied and the drains were sewn in place. The  patient was successfully extubated and transported to the recovery room  by the anesthesia providers without complication. POSTOPERATIVE PLAN:  We will obtain digital x-rays and CT scan of the  pelvis postoperatively. The drains will be maintained until their  output is below 30 mL per shift each. The patient will be allowed to  begin toe-touch weightbearing and range of motion as tolerated for the  operative extremity. He will receive 23 hours of prophylactic  antibiotics and resume DVT prophylaxis on postoperative day #1 if felt  to be appropriate by the Primary Service. He will be evaluated by  physical and occupational therapy. Following discharge, he will be seen  in the [address] in approximately 10-14 days from the  date of this procedure for a wound check and anticipated staple removal  at that time. He will be toe-touch weightbearing for approximately  10-12 weeks from the date of this procedure.         Navjot Tobar    D: 05/28/2022 22:22:18       T: 05/29/2022 1:10:35     [email or web address]  Job#: [id number]     Doc#: [id number]    CC:

## 2022-05-29 NOTE — PLAN OF CARE
Problem: Discharge Planning  Goal: Discharge to home or other facility with appropriate resources  5/29/2022 1615 by Nelson Mora RN  Outcome: Progressing  5/29/2022 0518 by Kathe Vasques RN  Outcome: Progressing  5/29/2022 0518 by Kathe Vasques RN  Outcome: Progressing     Problem: Skin/Tissue Integrity  Goal: Absence of new skin breakdown  Description: 1. Monitor for areas of redness and/or skin breakdown  2. Assess vascular access sites hourly  3. Every 4-6 hours minimum:  Change oxygen saturation probe site  4. Every 4-6 hours:  If on nasal continuous positive airway pressure, respiratory therapy assess nares and determine need for appliance change or resting period.   5/29/2022 0518 by Kathe Vasques RN  Outcome: Progressing  5/29/2022 0518 by Kathe Vasques RN  Outcome: Progressing     Problem: ABCDS Injury Assessment  Goal: Absence of physical injury  5/29/2022 0518 by Kathe Vasques RN  Outcome: Progressing  5/29/2022 0518 by Kathe Vasques RN  Outcome: Progressing     Problem: Safety - Adult  Goal: Free from fall injury  5/29/2022 1615 by Nelson Mora RN  Outcome: Progressing  5/29/2022 0518 by Kathe Vasques RN  Outcome: Progressing  5/29/2022 0518 by Kateh Vasques RN  Outcome: Progressing     Problem: Pain  Goal: Verbalizes/displays adequate comfort level or baseline comfort level  5/29/2022 1615 by Nelson Mora RN  Outcome: Progressing  5/29/2022 0518 by Kathe Vasques RN  Outcome: Progressing  5/29/2022 0518 by Kathe Vasques RN  Outcome: Progressing

## 2022-05-29 NOTE — PLAN OF CARE
Problem: Discharge Planning  Goal: Discharge to home or other facility with appropriate resources  5/29/2022 0518 by Cm Crow RN  Outcome: Progressing  5/29/2022 0518 by Cm Crow RN  Outcome: Progressing  5/28/2022 1531 by Stefano Best RN  Outcome: Progressing     Problem: Skin/Tissue Integrity  Goal: Absence of new skin breakdown  Description: 1. Monitor for areas of redness and/or skin breakdown  2. Assess vascular access sites hourly  3. Every 4-6 hours minimum:  Change oxygen saturation probe site  4. Every 4-6 hours:  If on nasal continuous positive airway pressure, respiratory therapy assess nares and determine need for appliance change or resting period.   5/29/2022 0518 by Cm Crow RN  Outcome: Progressing  5/29/2022 0518 by Cm Crow RN  Outcome: Progressing     Problem: ABCDS Injury Assessment  Goal: Absence of physical injury  5/29/2022 0518 by Cm Crow RN  Outcome: Progressing  5/29/2022 0518 by Cm Crow RN  Outcome: Progressing     Problem: Safety - Adult  Goal: Free from fall injury  5/29/2022 0518 by Cm Crow RN  Outcome: Progressing  5/29/2022 0518 by Cm Crow RN  Outcome: Progressing  5/28/2022 1531 by Stefano Best RN  Outcome: Progressing     Problem: Pain  Goal: Verbalizes/displays adequate comfort level or baseline comfort level  5/29/2022 0518 by Cm Crow RN  Outcome: Progressing  5/29/2022 0518 by Cm Crow RN  Outcome: Progressing  5/28/2022 1531 by Stefano Best RN  Outcome: Progressing

## 2022-05-29 NOTE — PROGRESS NOTES
Orthopedic Progress Note    Patient:  Amira Mcallister, 48 y.o. male  YOB: 1975       Subjective:  Patient seen and examined. No complaints or concerns. Pain controlled on current regimen. No issues overnight. Denies fever, HA, CP, SOB, N/V. Tolerating PO intake. Mukesh sit-stand, 2' lateral steps w/ PT. No BM since surgery, passing flatus. Urinating without difficulty. Hemovac drains in place; proximal/distal 10/20 cc of serosanguinous fluid respectively in 24 hours. Drains pulled, dressings replaced. Objective:   Vitals:    05/29/22 0000   BP: 119/75   Pulse: 67   Resp: 21   Temp: 98.4 °F (36.9 °C)   SpO2: 95%     Gen: NAD, cooperative    Cardiovascular: Regular rate    Respiratory: Symmetric chest rise. No accessory muscle use    RLE: Hemovac drains in place; proximal/distal 10/20 cc of serosanguinous fluid respectively in 24 hours. Dressings d/i with mild spotting in optifoam dressing, left intact, changed dressing for drains. Compartments soft. 2+ DP pulse. TA/EHL/FHL/GS motor intact. Deep and Superficial Peroneal/Saphenous/Sural SILT. Recent Labs     05/28/22  0715 05/28/22  0754   WBC 11.8*  --    HGB 9.4*  --    HCT 28.8*  --      --    NA  --  133*   K  --  4.2   BUN  --  18   CREATININE  --  1.22*   GLUCOSE  --  140*      Anticoag: Lovenox  See rec for complete list     Impression 48 y.o. male being seen for the following orthopaedic injuries     - Right posterior wall/posterior column acetabulum ORIF, POD2     Plan     - Weightbearing status: TTWB to RLE  - Drains pulled, proximal/distal 10/20 cc of serosanguinous fluid respectively in 24 hours. - Keep dressings clean, dry, and intact. Ok to reinforce prn. New optifoams at bed side if increased saturation after drains pulled.   - Ice right hip for pain/swelling  - Chemical AC per primary  - Encourage IS and mobilization with PT/OT  - No further plans for surgery per ortho; OK to dc once medically stable  - Please page ortho with any questions    Electronically signed by Darnelle Runner, DO, on 5/29/2022 at 6:32 AM.
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
## 2022-05-30 LAB
ABSOLUTE EOS #: 0.36 K/UL (ref 0–0.44)
ABSOLUTE IMMATURE GRANULOCYTE: 0.05 K/UL (ref 0–0.3)
ABSOLUTE LYMPH #: 1.51 K/UL (ref 1.1–3.7)
ABSOLUTE MONO #: 1.12 K/UL (ref 0.1–1.2)
ANION GAP SERPL CALCULATED.3IONS-SCNC: 11 MMOL/L (ref 9–17)
BASOPHILS # BLD: 0 % (ref 0–2)
BASOPHILS ABSOLUTE: 0.03 K/UL (ref 0–0.2)
BUN BLDV-MCNC: 14 MG/DL (ref 6–20)
CALCIUM SERPL-MCNC: 8.7 MG/DL (ref 8.6–10.4)
CHLORIDE BLD-SCNC: 101 MMOL/L (ref 98–107)
CO2: 24 MMOL/L (ref 20–31)
CREAT SERPL-MCNC: 0.66 MG/DL (ref 0.7–1.2)
EOSINOPHILS RELATIVE PERCENT: 5 % (ref 1–4)
GFR AFRICAN AMERICAN: >60 ML/MIN
GFR NON-AFRICAN AMERICAN: >60 ML/MIN
GFR SERPL CREATININE-BSD FRML MDRD: ABNORMAL ML/MIN/{1.73_M2}
GLUCOSE BLD-MCNC: 108 MG/DL (ref 70–99)
HCT VFR BLD CALC: 26.9 % (ref 40.7–50.3)
HEMOGLOBIN: 8.8 G/DL (ref 13–17)
IMMATURE GRANULOCYTES: 1 %
LYMPHOCYTES # BLD: 19 % (ref 24–43)
MCH RBC QN AUTO: 29.1 PG (ref 25.2–33.5)
MCHC RBC AUTO-ENTMCNC: 32.7 G/DL (ref 28.4–34.8)
MCV RBC AUTO: 89.1 FL (ref 82.6–102.9)
MONOCYTES # BLD: 14 % (ref 3–12)
NRBC AUTOMATED: 0 PER 100 WBC
PDW BLD-RTO: 14.3 % (ref 11.8–14.4)
PLATELET # BLD: 219 K/UL (ref 138–453)
PMV BLD AUTO: 8.8 FL (ref 8.1–13.5)
POTASSIUM SERPL-SCNC: 4 MMOL/L (ref 3.7–5.3)
RBC # BLD: 3.02 M/UL (ref 4.21–5.77)
SEG NEUTROPHILS: 61 % (ref 36–65)
SEGMENTED NEUTROPHILS ABSOLUTE COUNT: 4.72 K/UL (ref 1.5–8.1)
SODIUM BLD-SCNC: 136 MMOL/L (ref 135–144)
WBC # BLD: 7.8 K/UL (ref 3.5–11.3)

## 2022-05-30 PROCEDURE — 6370000000 HC RX 637 (ALT 250 FOR IP): Performed by: STUDENT IN AN ORGANIZED HEALTH CARE EDUCATION/TRAINING PROGRAM

## 2022-05-30 PROCEDURE — 80048 BASIC METABOLIC PNL TOTAL CA: CPT

## 2022-05-30 PROCEDURE — 97535 SELF CARE MNGMENT TRAINING: CPT

## 2022-05-30 PROCEDURE — 2060000000 HC ICU INTERMEDIATE R&B

## 2022-05-30 PROCEDURE — 97530 THERAPEUTIC ACTIVITIES: CPT

## 2022-05-30 PROCEDURE — 36415 COLL VENOUS BLD VENIPUNCTURE: CPT

## 2022-05-30 PROCEDURE — 6360000002 HC RX W HCPCS: Performed by: STUDENT IN AN ORGANIZED HEALTH CARE EDUCATION/TRAINING PROGRAM

## 2022-05-30 PROCEDURE — 85025 COMPLETE CBC W/AUTO DIFF WBC: CPT

## 2022-05-30 RX ORDER — SENNA PLUS 8.6 MG/1
1 TABLET ORAL DAILY
Status: DISCONTINUED | OUTPATIENT
Start: 2022-05-30 | End: 2022-05-30

## 2022-05-30 RX ORDER — DOCUSATE SODIUM 100 MG/1
100 CAPSULE, LIQUID FILLED ORAL DAILY
Status: DISCONTINUED | OUTPATIENT
Start: 2022-05-30 | End: 2022-06-02 | Stop reason: HOSPADM

## 2022-05-30 RX ORDER — DIPHENHYDRAMINE HCL 25 MG
25 TABLET ORAL ONCE
Status: COMPLETED | OUTPATIENT
Start: 2022-05-30 | End: 2022-05-30

## 2022-05-30 RX ORDER — SENNA PLUS 8.6 MG/1
1 TABLET ORAL DAILY PRN
Status: DISCONTINUED | OUTPATIENT
Start: 2022-05-30 | End: 2022-06-01

## 2022-05-30 RX ORDER — GINSENG 100 MG
CAPSULE ORAL 3 TIMES DAILY
Status: DISCONTINUED | OUTPATIENT
Start: 2022-05-30 | End: 2022-06-02 | Stop reason: HOSPADM

## 2022-05-30 RX ADMIN — DIPHENHYDRAMINE HCL 25 MG: 25 TABLET ORAL at 22:48

## 2022-05-30 RX ADMIN — DOCUSATE SODIUM 100 MG: 100 CAPSULE ORAL at 10:38

## 2022-05-30 RX ADMIN — LOSARTAN POTASSIUM 50 MG: 50 TABLET, FILM COATED ORAL at 10:36

## 2022-05-30 RX ADMIN — OXYCODONE 5 MG: 5 TABLET ORAL at 05:53

## 2022-05-30 RX ADMIN — OXYCODONE 5 MG: 5 TABLET ORAL at 22:48

## 2022-05-30 RX ADMIN — OXYCODONE 5 MG: 5 TABLET ORAL at 17:16

## 2022-05-30 RX ADMIN — GABAPENTIN 300 MG: 300 CAPSULE ORAL at 13:31

## 2022-05-30 RX ADMIN — CARVEDILOL 25 MG: 12.5 TABLET, FILM COATED ORAL at 17:17

## 2022-05-30 RX ADMIN — METHOCARBAMOL TABLETS 750 MG: 750 TABLET, COATED ORAL at 17:17

## 2022-05-30 RX ADMIN — ACETAMINOPHEN 1000 MG: 325 TABLET ORAL at 05:53

## 2022-05-30 RX ADMIN — HYDROCHLOROTHIAZIDE 25 MG: 25 TABLET ORAL at 09:05

## 2022-05-30 RX ADMIN — ENOXAPARIN SODIUM 30 MG: 100 INJECTION SUBCUTANEOUS at 21:13

## 2022-05-30 RX ADMIN — METHOCARBAMOL TABLETS 750 MG: 750 TABLET, COATED ORAL at 09:05

## 2022-05-30 RX ADMIN — LOSARTAN POTASSIUM 50 MG: 50 TABLET, FILM COATED ORAL at 21:12

## 2022-05-30 RX ADMIN — SENNOSIDES 8.6 MG: 8.6 TABLET, COATED ORAL at 21:12

## 2022-05-30 RX ADMIN — AMLODIPINE BESYLATE 10 MG: 10 TABLET ORAL at 09:05

## 2022-05-30 RX ADMIN — METHOCARBAMOL TABLETS 750 MG: 750 TABLET, COATED ORAL at 21:13

## 2022-05-30 RX ADMIN — OXYCODONE 5 MG: 5 TABLET ORAL at 10:38

## 2022-05-30 RX ADMIN — BACITRACIN: 500 OINTMENT TOPICAL at 21:13

## 2022-05-30 RX ADMIN — GABAPENTIN 300 MG: 300 CAPSULE ORAL at 21:12

## 2022-05-30 RX ADMIN — METHOCARBAMOL TABLETS 750 MG: 750 TABLET, COATED ORAL at 04:31

## 2022-05-30 RX ADMIN — CARVEDILOL 25 MG: 12.5 TABLET, FILM COATED ORAL at 09:06

## 2022-05-30 RX ADMIN — ACETAMINOPHEN 1000 MG: 325 TABLET ORAL at 21:13

## 2022-05-30 RX ADMIN — ACETAMINOPHEN 1000 MG: 325 TABLET ORAL at 13:31

## 2022-05-30 RX ADMIN — GABAPENTIN 300 MG: 300 CAPSULE ORAL at 05:53

## 2022-05-30 ASSESSMENT — PAIN SCALES - GENERAL
PAINLEVEL_OUTOF10: 9
PAINLEVEL_OUTOF10: 8
PAINLEVEL_OUTOF10: 7
PAINLEVEL_OUTOF10: 7
PAINLEVEL_OUTOF10: 8
PAINLEVEL_OUTOF10: 10
PAINLEVEL_OUTOF10: 9
PAINLEVEL_OUTOF10: 8

## 2022-05-30 NOTE — PLAN OF CARE
Problem: Discharge Planning  Goal: Discharge to home or other facility with appropriate resources  5/30/2022 0502 by Jarrett Habermann, RN  Outcome: Progressing  5/30/2022 0502 by Jarrett Habermann, RN  Outcome: Progressing  5/29/2022 1615 by Ivet Ramirez RN  Outcome: Progressing     Problem: Skin/Tissue Integrity  Goal: Absence of new skin breakdown  Description: 1. Monitor for areas of redness and/or skin breakdown  2. Assess vascular access sites hourly  3. Every 4-6 hours minimum:  Change oxygen saturation probe site  4. Every 4-6 hours:  If on nasal continuous positive airway pressure, respiratory therapy assess nares and determine need for appliance change or resting period.   5/30/2022 0502 by Jarrett Habermann, RN  Outcome: Progressing  5/30/2022 0502 by Jarrett Habermann, RN  Outcome: Progressing     Problem: ABCDS Injury Assessment  Goal: Absence of physical injury  5/30/2022 0502 by Jarrett Habermann, RN  Outcome: Progressing  5/30/2022 0502 by Jarrett Habermann, RN  Outcome: Progressing     Problem: Safety - Adult  Goal: Free from fall injury  5/30/2022 0502 by Jarrett Habermann, RN  Outcome: Progressing  5/30/2022 0502 by Jarrett Habermann, RN  Outcome: Progressing  5/29/2022 1615 by Ivet Ramirez RN  Outcome: Progressing     Problem: Pain  Goal: Verbalizes/displays adequate comfort level or baseline comfort level  5/30/2022 0502 by Jarrett Habermann, RN  Outcome: Progressing  5/30/2022 0502 by Jarrett Habermann, RN  Outcome: Progressing  5/29/2022 1615 by Ivet Ramirez RN  Outcome: Progressing
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
## 2022-05-31 PROCEDURE — 6370000000 HC RX 637 (ALT 250 FOR IP): Performed by: STUDENT IN AN ORGANIZED HEALTH CARE EDUCATION/TRAINING PROGRAM

## 2022-05-31 PROCEDURE — 6360000002 HC RX W HCPCS: Performed by: STUDENT IN AN ORGANIZED HEALTH CARE EDUCATION/TRAINING PROGRAM

## 2022-05-31 PROCEDURE — 99213 OFFICE O/P EST LOW 20 MIN: CPT

## 2022-05-31 PROCEDURE — 2060000000 HC ICU INTERMEDIATE R&B

## 2022-05-31 PROCEDURE — 99253 IP/OBS CNSLTJ NEW/EST LOW 45: CPT | Performed by: STUDENT IN AN ORGANIZED HEALTH CARE EDUCATION/TRAINING PROGRAM

## 2022-05-31 PROCEDURE — 97530 THERAPEUTIC ACTIVITIES: CPT

## 2022-05-31 PROCEDURE — 97116 GAIT TRAINING THERAPY: CPT

## 2022-05-31 RX ORDER — LIDOCAINE 4 G/G
1 PATCH TOPICAL DAILY
Status: DISCONTINUED | OUTPATIENT
Start: 2022-05-31 | End: 2022-06-02 | Stop reason: HOSPADM

## 2022-05-31 RX ADMIN — POLYETHYLENE GLYCOL 3350 17 G: 17 POWDER, FOR SOLUTION ORAL at 08:32

## 2022-05-31 RX ADMIN — ENOXAPARIN SODIUM 30 MG: 100 INJECTION SUBCUTANEOUS at 21:48

## 2022-05-31 RX ADMIN — HYDROCHLOROTHIAZIDE 25 MG: 25 TABLET ORAL at 08:28

## 2022-05-31 RX ADMIN — GABAPENTIN 300 MG: 300 CAPSULE ORAL at 21:48

## 2022-05-31 RX ADMIN — AMLODIPINE BESYLATE 10 MG: 10 TABLET ORAL at 08:28

## 2022-05-31 RX ADMIN — DOCUSATE SODIUM 100 MG: 100 CAPSULE ORAL at 08:28

## 2022-05-31 RX ADMIN — GABAPENTIN 300 MG: 300 CAPSULE ORAL at 15:29

## 2022-05-31 RX ADMIN — ACETAMINOPHEN 1000 MG: 325 TABLET ORAL at 15:29

## 2022-05-31 RX ADMIN — METHOCARBAMOL TABLETS 750 MG: 750 TABLET, COATED ORAL at 15:29

## 2022-05-31 RX ADMIN — LOSARTAN POTASSIUM 50 MG: 50 TABLET, FILM COATED ORAL at 21:49

## 2022-05-31 RX ADMIN — BACITRACIN: 500 OINTMENT TOPICAL at 15:41

## 2022-05-31 RX ADMIN — BACITRACIN: 500 OINTMENT TOPICAL at 21:47

## 2022-05-31 RX ADMIN — OXYCODONE 5 MG: 5 TABLET ORAL at 05:32

## 2022-05-31 RX ADMIN — CARVEDILOL 25 MG: 12.5 TABLET, FILM COATED ORAL at 08:28

## 2022-05-31 RX ADMIN — GABAPENTIN 300 MG: 300 CAPSULE ORAL at 05:32

## 2022-05-31 RX ADMIN — METHOCARBAMOL TABLETS 750 MG: 750 TABLET, COATED ORAL at 09:14

## 2022-05-31 RX ADMIN — CARVEDILOL 25 MG: 12.5 TABLET, FILM COATED ORAL at 18:14

## 2022-05-31 RX ADMIN — METHOCARBAMOL TABLETS 750 MG: 750 TABLET, COATED ORAL at 04:28

## 2022-05-31 RX ADMIN — METHOCARBAMOL TABLETS 750 MG: 750 TABLET, COATED ORAL at 21:49

## 2022-05-31 RX ADMIN — BACITRACIN: 500 OINTMENT TOPICAL at 08:33

## 2022-05-31 RX ADMIN — ACETAMINOPHEN 1000 MG: 325 TABLET ORAL at 05:32

## 2022-05-31 RX ADMIN — ACETAMINOPHEN 1000 MG: 325 TABLET ORAL at 21:47

## 2022-05-31 RX ADMIN — ENOXAPARIN SODIUM 30 MG: 100 INJECTION SUBCUTANEOUS at 08:28

## 2022-05-31 RX ADMIN — LOSARTAN POTASSIUM 50 MG: 50 TABLET, FILM COATED ORAL at 08:28

## 2022-05-31 ASSESSMENT — ENCOUNTER SYMPTOMS
CONSTIPATION: 1
DOUBLE VISION: 0
BLURRED VISION: 0
ABDOMINAL PAIN: 0
SHORTNESS OF BREATH: 0

## 2022-05-31 ASSESSMENT — PAIN DESCRIPTION - LOCATION
LOCATION: RIB CAGE
LOCATION: RIB CAGE

## 2022-05-31 ASSESSMENT — PAIN SCALES - GENERAL
PAINLEVEL_OUTOF10: 7
PAINLEVEL_OUTOF10: 4
PAINLEVEL_OUTOF10: 6
PAINLEVEL_OUTOF10: 7
PAINLEVEL_OUTOF10: 5
PAINLEVEL_OUTOF10: 5

## 2022-05-31 ASSESSMENT — PAIN DESCRIPTION - ORIENTATION: ORIENTATION: RIGHT

## 2022-05-31 ASSESSMENT — PAIN DESCRIPTION - DESCRIPTORS
DESCRIPTORS: ACHING;DISCOMFORT
DESCRIPTORS: ACHING;DISCOMFORT
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
## 2022-05-31 NOTE — PROGRESS NOTES
[facility] Wound Ostomy Continence Nurse  Consult Note       NAME:  Anson Peres  MEDICAL RECORD NUMBER:  [id number]  AGE: 48 y.o. GENDER: male  : 1975  TODAY'S DATE:  2022    Subjective:     Reason for WOCN Evaluation and Assessment: \"LUE wound\"      Anson Peres is a 48 y.o. male referred by:   [x] Physician  [] Nursing  [] Other:     Wound Identification:  Wound Type: traumatic  Contributing Factors: edema        Wound Care Currently: Telfa, roll gauze, Coban    Patient reports the left elbow laceration was sutured on 2022. Suture removal timing per physician. Objective:      /72   Pulse 90   Temp 98.2 °F (36.8 °C) (Oral)   Resp 14   Ht 6' 2\" (1.88 m)   Wt 234 lb 11.2 oz (106.5 kg)   SpO2 95%   BMI 30.13 kg/m²   Kenneth Risk Score: Kenneth Scale Score: 18    LABS    CBC:   Lab Results   Component Value Date    WBC 7.8 2022    RBC 3.02 2022    HGB 8.8 2022     CMP:  Albumin:  No results found for: LABALBU  PT/INR:    Lab Results   Component Value Date    PROTIME 13.4 2022    INR 1.0 2022     HgBA1c:  No results found for: LABA1C  PTT: No components found for: LABPTT      Assessment:       Measurements:       22 1105   Wound 22 Arm Left; Lower   Date First Assessed/Time First Assessed: 22   Primary Wound Type: Traumatic  Location: Arm  Wound Location Orientation: Left; Lower   Wound Etiology Traumatic   Dressing Status New dressing applied   Wound Cleansed Soap and water   Dressing/Treatment Antibacterial ointment;Non adherent;Dry dressing;Roll gauze; Ace wrap   Wound Length (cm)   (scattered linear abrasions)   Wound Assessment Superficial;Dry   Drainage Amount None   Odor None   Essie-wound Assessment Intact;Edematous   Wound 22 Hand Anterior; Left knuckle   Date First Assessed/Time First Assessed: 22   Primary Wound Type: Traumatic  Location: Hand  Wound Location Orientation: Anterior; Left  Wound Description (Comments): knuckle   Wound Etiology Traumatic   Dressing Status New dressing applied   Wound Cleansed Soap and water   Dressing/Treatment Antibacterial ointment;Non adherent;Dry dressing;Roll gauze; Ace wrap   Wound Length (cm) 1.2 cm   Wound Width (cm) 1.2 cm   Wound Depth (cm) 0.2 cm   Wound Surface Area (cm^2) 1.44 cm^2   Wound Volume (cm^3) 0.288 cm^3   Wound Assessment Pink/red   Drainage Amount Small   Drainage Description Serous   Odor None   Essie-wound Assessment Maceration  (loose, devitalized skin flap)   Wound 05/26/22 Right scalp   Date First Assessed/Time First Assessed: 05/26/22 0223   Present on Hospital Admission: Yes  Primary Wound Type: Traumatic  Wound Location Orientation: Right  Wound Description (Comments): scalp   Wound Etiology Traumatic   Wound Cleansed Soap and water   Dressing/Treatment Antibacterial ointment   Wound Assessment Superficial;Pink/red  (crusted, dry blood)   Drainage Amount Scant   Drainage Description Sanguinous   Odor None   Essie-wound Assessment Intact   Wound 05/26/22 scalp mid   Date First Assessed/Time First Assessed: 05/26/22 0223   Present on Hospital Admission: Yes  Primary Wound Type: Traumatic  Wound Description (Comments): scalp mid   Wound Etiology Traumatic   Wound Cleansed Soap and water   Dressing/Treatment Antibacterial ointment   Wound Assessment Superficial;Pink/red;Epithelialization   Drainage Amount None   Odor None   Essie-wound Assessment Intact   Wound 05/26/22 Elbow Left   Date First Assessed: 05/26/22   Present on Hospital Admission: Yes  Primary Wound Type: Traumatic  Location: Elbow  Wound Location Orientation: Left   Wound Etiology Traumatic   Dressing Status New dressing applied   Wound Cleansed Soap and water   Dressing/Treatment Antibacterial ointment;Non adherent;Dry dressing;Roll gauze; Ace wrap   Wound Length (cm) 1.5 cm   Wound Width (cm) 0.5 cm   Wound Depth (cm) 0.2 cm   Wound Surface Area (cm^2) 0.75 cm^2   Wound Volume (cm^3) 0.15 cm^3 Wound Assessment Pink/red   Drainage Amount Small   Drainage Description Serous   Odor None   Essie-wound Assessment Edematous; Other (Comment)  (well approximated, epithelialized, repaired laceration)         Response to treatment:  Well tolerated by patient. Plan:     Plan of Care:   Elevate the left arm. Wash the abrasions with soap and water  Apply antibiotic ointment, Curad oil emulsion dressing, dry gauze, roll gauze and ACE wrap. Patient may remove the ACE wrap for comfort. Please continue use of the antibiotic ointment to the two scalp abrasions.      Specialty Bed Required : Yes: Tracie Isoflex in use   [] Low Air Loss   [x] Pressure Redistribution  [] Fluid Immersion  [] Bariatric  [] Total Pressure Relief  [] Other:     Discharge Plan:  Acute rehab is the patient's goal.    Patient/Caregiver Teaching:    [] Indicates understanding       [] Needs reinforcement  [] Unsuccessful      [x] Verbal Understanding  [] Demonstrated understanding       [] No evidence of learning  [] Refused teaching         [] N/A       Electronically signed by Rita Alvarez RN, Dav Booker on 5/31/2022 at 12:34 PM

## 2022-05-31 NOTE — PLAN OF CARE
Problem: Discharge Planning  Goal: Discharge to home or other facility with appropriate resources  5/31/2022 1834 by Ismael Bryant RN  Outcome: Progressing     Problem: Skin/Tissue Integrity  Goal: Absence of new skin breakdown  Description: 1. Monitor for areas of redness and/or skin breakdown  2. Assess vascular access sites hourly  3. Every 4-6 hours minimum:  Change oxygen saturation probe site  4. Every 4-6 hours:  If on nasal continuous positive airway pressure, respiratory therapy assess nares and determine need for appliance change or resting period.   5/31/2022 1834 by Ismael Bryant RN  Outcome: Progressing     Problem: ABCDS Injury Assessment  Goal: Absence of physical injury  5/31/2022 1834 by Ismael Bryant RN  Outcome: Progressing     Problem: Safety - Adult  Goal: Free from fall injury  5/31/2022 1834 by Ismael Bryant RN  Outcome: Progressing     Problem: Pain  Goal: Verbalizes/displays adequate comfort level or baseline comfort level  5/31/2022 1834 by Ismael Bryant RN  Outcome: Progressing

## 2022-05-31 NOTE — PLAN OF CARE
Problem: Discharge Planning  Goal: Discharge to home or other facility with appropriate resources  5/31/2022 0503 by Alexis Dowd RN  Outcome: Progressing  5/31/2022 0503 by Alexis Dowd RN  Outcome: Progressing     Problem: Skin/Tissue Integrity  Goal: Absence of new skin breakdown  Description: 1. Monitor for areas of redness and/or skin breakdown  2. Assess vascular access sites hourly  3. Every 4-6 hours minimum:  Change oxygen saturation probe site  4. Every 4-6 hours:  If on nasal continuous positive airway pressure, respiratory therapy assess nares and determine need for appliance change or resting period.   5/31/2022 0503 by Alexis Dowd RN  Outcome: Progressing  5/31/2022 0503 by Alexis Dowd RN  Outcome: Progressing     Problem: ABCDS Injury Assessment  Goal: Absence of physical injury  5/31/2022 0503 by Alexis Dowd RN  Outcome: Progressing  5/31/2022 0503 by Alxeis Dowd RN  Outcome: Progressing     Problem: Safety - Adult  Goal: Free from fall injury  5/31/2022 0503 by Alexis Dowd RN  Outcome: Progressing  5/31/2022 0503 by Alexis Dowd RN  Outcome: Progressing     Problem: Pain  Goal: Verbalizes/displays adequate comfort level or baseline comfort level  5/31/2022 0503 by Alexis Dowd RN  Outcome: Progressing  5/31/2022 0503 by Alexis Dowd RN  Outcome: Progressing
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
## 2022-05-31 NOTE — CARE COORDINATION
Transitional Plannin: Spoke with patient regarding discharge plans. He was interested in ARU. PS physician for a PM&R consult. Gave list of facilities to patient to choose one. Gave patient t-shirt and sweats since they cut off  His clothes. 1250: Patient is agreeable to ARU. He chose [facility], sent referral to ARU for acceptance. 1617: Patient was accepted at [facility].  Patient and Nurse updated

## 2022-06-01 PROCEDURE — 6360000002 HC RX W HCPCS: Performed by: STUDENT IN AN ORGANIZED HEALTH CARE EDUCATION/TRAINING PROGRAM

## 2022-06-01 PROCEDURE — 6370000000 HC RX 637 (ALT 250 FOR IP): Performed by: NURSE PRACTITIONER

## 2022-06-01 PROCEDURE — 97530 THERAPEUTIC ACTIVITIES: CPT

## 2022-06-01 PROCEDURE — 1200000000 HC SEMI PRIVATE

## 2022-06-01 PROCEDURE — 6370000000 HC RX 637 (ALT 250 FOR IP): Performed by: STUDENT IN AN ORGANIZED HEALTH CARE EDUCATION/TRAINING PROGRAM

## 2022-06-01 PROCEDURE — 97116 GAIT TRAINING THERAPY: CPT

## 2022-06-01 PROCEDURE — 97535 SELF CARE MNGMENT TRAINING: CPT

## 2022-06-01 PROCEDURE — 2580000003 HC RX 258: Performed by: STUDENT IN AN ORGANIZED HEALTH CARE EDUCATION/TRAINING PROGRAM

## 2022-06-01 RX ORDER — LOSARTAN POTASSIUM 50 MG/1
50 TABLET ORAL 2 TIMES DAILY
Status: CANCELLED | OUTPATIENT
Start: 2022-06-01

## 2022-06-01 RX ORDER — HYDROCHLOROTHIAZIDE 25 MG/1
25 TABLET ORAL DAILY
Status: CANCELLED | OUTPATIENT
Start: 2022-06-01

## 2022-06-01 RX ORDER — OXYCODONE HYDROCHLORIDE 5 MG/1
5 TABLET ORAL EVERY 12 HOURS PRN
Status: DISCONTINUED | OUTPATIENT
Start: 2022-06-01 | End: 2022-06-02 | Stop reason: HOSPADM

## 2022-06-01 RX ORDER — GINSENG 100 MG
CAPSULE ORAL 3 TIMES DAILY
Status: CANCELLED | OUTPATIENT
Start: 2022-06-01

## 2022-06-01 RX ORDER — DOCUSATE SODIUM 100 MG/1
100 CAPSULE, LIQUID FILLED ORAL DAILY
Status: CANCELLED | OUTPATIENT
Start: 2022-06-01

## 2022-06-01 RX ORDER — ENOXAPARIN SODIUM 100 MG/ML
30 INJECTION SUBCUTANEOUS 2 TIMES DAILY
Status: CANCELLED | OUTPATIENT
Start: 2022-06-01

## 2022-06-01 RX ORDER — METHOCARBAMOL 750 MG/1
750 TABLET, FILM COATED ORAL 3 TIMES DAILY PRN
Status: DISCONTINUED | OUTPATIENT
Start: 2022-06-01 | End: 2022-06-02 | Stop reason: HOSPADM

## 2022-06-01 RX ORDER — ACETAMINOPHEN 500 MG
1000 TABLET ORAL EVERY 8 HOURS SCHEDULED
Status: CANCELLED | OUTPATIENT
Start: 2022-06-01 | End: 2022-06-04

## 2022-06-01 RX ORDER — CARVEDILOL 25 MG/1
25 TABLET ORAL 2 TIMES DAILY WITH MEALS
Status: CANCELLED | OUTPATIENT
Start: 2022-06-01

## 2022-06-01 RX ORDER — LIDOCAINE 4 G/G
1 PATCH TOPICAL DAILY
Status: CANCELLED | OUTPATIENT
Start: 2022-06-01

## 2022-06-01 RX ORDER — AMLODIPINE BESYLATE 10 MG/1
10 TABLET ORAL DAILY
Status: CANCELLED | OUTPATIENT
Start: 2022-06-01

## 2022-06-01 RX ORDER — METHOCARBAMOL 750 MG/1
750 TABLET, FILM COATED ORAL 3 TIMES DAILY PRN
Status: CANCELLED | OUTPATIENT
Start: 2022-06-01

## 2022-06-01 RX ORDER — OXYCODONE HYDROCHLORIDE 5 MG/1
5 TABLET ORAL EVERY 12 HOURS PRN
Status: CANCELLED | OUTPATIENT
Start: 2022-06-01 | End: 2022-06-03

## 2022-06-01 RX ORDER — POLYETHYLENE GLYCOL 3350 17 G/17G
17 POWDER, FOR SOLUTION ORAL DAILY
Status: CANCELLED | OUTPATIENT
Start: 2022-06-01

## 2022-06-01 RX ADMIN — HYDROCHLOROTHIAZIDE 25 MG: 25 TABLET ORAL at 08:32

## 2022-06-01 RX ADMIN — ENOXAPARIN SODIUM 30 MG: 100 INJECTION SUBCUTANEOUS at 20:16

## 2022-06-01 RX ADMIN — OXYCODONE 5 MG: 5 TABLET ORAL at 20:16

## 2022-06-01 RX ADMIN — ACETAMINOPHEN 1000 MG: 325 TABLET ORAL at 05:13

## 2022-06-01 RX ADMIN — ACETAMINOPHEN 1000 MG: 325 TABLET ORAL at 22:10

## 2022-06-01 RX ADMIN — DOCUSATE SODIUM 100 MG: 100 CAPSULE ORAL at 08:43

## 2022-06-01 RX ADMIN — SODIUM CHLORIDE, PRESERVATIVE FREE 10 ML: 5 INJECTION INTRAVENOUS at 20:17

## 2022-06-01 RX ADMIN — METHOCARBAMOL TABLETS 750 MG: 750 TABLET, COATED ORAL at 20:16

## 2022-06-01 RX ADMIN — GABAPENTIN 300 MG: 300 CAPSULE ORAL at 05:13

## 2022-06-01 RX ADMIN — ACETAMINOPHEN 1000 MG: 325 TABLET ORAL at 13:09

## 2022-06-01 RX ADMIN — CARVEDILOL 25 MG: 12.5 TABLET, FILM COATED ORAL at 08:32

## 2022-06-01 RX ADMIN — ENOXAPARIN SODIUM 30 MG: 100 INJECTION SUBCUTANEOUS at 08:32

## 2022-06-01 RX ADMIN — CARVEDILOL 25 MG: 12.5 TABLET, FILM COATED ORAL at 18:25

## 2022-06-01 RX ADMIN — METHOCARBAMOL TABLETS 750 MG: 750 TABLET, COATED ORAL at 08:30

## 2022-06-01 RX ADMIN — LOSARTAN POTASSIUM 50 MG: 50 TABLET, FILM COATED ORAL at 20:16

## 2022-06-01 RX ADMIN — LOSARTAN POTASSIUM 50 MG: 50 TABLET, FILM COATED ORAL at 08:32

## 2022-06-01 RX ADMIN — BACITRACIN: 500 OINTMENT TOPICAL at 13:10

## 2022-06-01 RX ADMIN — BACITRACIN: 500 OINTMENT TOPICAL at 20:17

## 2022-06-01 RX ADMIN — METHOCARBAMOL TABLETS 750 MG: 750 TABLET, COATED ORAL at 05:13

## 2022-06-01 RX ADMIN — BACITRACIN: 500 OINTMENT TOPICAL at 08:33

## 2022-06-01 RX ADMIN — AMLODIPINE BESYLATE 10 MG: 10 TABLET ORAL at 08:32

## 2022-06-01 ASSESSMENT — PAIN DESCRIPTION - LOCATION
LOCATION: RIB CAGE;PELVIS
LOCATION: HIP;RIB CAGE
LOCATION: RIB CAGE;PELVIS

## 2022-06-01 ASSESSMENT — PAIN DESCRIPTION - DESCRIPTORS
DESCRIPTORS: ACHING;DISCOMFORT

## 2022-06-01 ASSESSMENT — PAIN SCALES - GENERAL
PAINLEVEL_OUTOF10: 0
PAINLEVEL_OUTOF10: 5
PAINLEVEL_OUTOF10: 5
PAINLEVEL_OUTOF10: 7

## 2022-06-01 ASSESSMENT — PAIN DESCRIPTION - ORIENTATION
ORIENTATION: RIGHT

## 2022-06-01 NOTE — PROGRESS NOTES
Rcv'd approval for ARU from [facility] via [address] [place] [address]. Notified SV CM via  and requested d/c readmit be completed with continuation of DVT prophylaxis and [place] called to [phone]. Writer will complete Admission Assessment in anticipation of pt's admit to ARU. Admission Assessment completed and Dr Candelario Castro notified. Spoke with Mami @ [facility] who verified pt's benefits for ARU which are as follows:  100% coverage, with no ded, copay, or co-ins.
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
## 2022-06-01 NOTE — CARE COORDINATION
Transitional  Plannin: Patient accepted at [facility], Transportation will  patient at 9 AM on 22. Updated patient,family, Nurse and Facility of transport time. LUKE RITTER and asked for DC/Readmit order and Lovenox Continuation per facility policy.  Packet completed

## 2022-06-01 NOTE — PLAN OF CARE
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
## 2022-06-02 ENCOUNTER — HOSPITAL ENCOUNTER (INPATIENT)
Age: 47
LOS: 7 days | Discharge: HOME | DRG: 560 | End: 2022-06-09
Attending: PHYSICAL MEDICINE & REHABILITATION | Admitting: PHYSICAL MEDICINE & REHABILITATION
Payer: COMMERCIAL

## 2022-06-02 VITALS
RESPIRATION RATE: 15 BRPM | DIASTOLIC BLOOD PRESSURE: 80 MMHG | HEART RATE: 78 BPM | TEMPERATURE: 98.3 F | WEIGHT: 234.7 LBS | HEIGHT: 74 IN | SYSTOLIC BLOOD PRESSURE: 126 MMHG | BODY MASS INDEX: 30.12 KG/M2 | OXYGEN SATURATION: 98 %
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
## 2022-06-02 DIAGNOSIS — T07.XXXA MULTIPLE TRAUMA: Primary | ICD-10-CM

## 2022-06-02 DIAGNOSIS — D50.0 IRON DEFICIENCY ANEMIA DUE TO CHRONIC BLOOD LOSS: ICD-10-CM

## 2022-06-02 DIAGNOSIS — S32.401A CLOSED DISPLACED FRACTURE OF RIGHT ACETABULUM, UNSPECIFIED PORTION OF ACETABULUM, INITIAL ENCOUNTER (HCC): ICD-10-CM

## 2022-06-02 PROCEDURE — 6370000000 HC RX 637 (ALT 250 FOR IP): Performed by: PHYSICAL MEDICINE & REHABILITATION

## 2022-06-02 PROCEDURE — 6360000002 HC RX W HCPCS: Performed by: NURSE PRACTITIONER

## 2022-06-02 PROCEDURE — 6370000000 HC RX 637 (ALT 250 FOR IP): Performed by: NURSE PRACTITIONER

## 2022-06-02 PROCEDURE — 99222 1ST HOSP IP/OBS MODERATE 55: CPT | Performed by: INTERNAL MEDICINE

## 2022-06-02 PROCEDURE — 1180000000 HC REHAB R&B

## 2022-06-02 PROCEDURE — 6370000000 HC RX 637 (ALT 250 FOR IP): Performed by: STUDENT IN AN ORGANIZED HEALTH CARE EDUCATION/TRAINING PROGRAM

## 2022-06-02 PROCEDURE — 97162 PT EVAL MOD COMPLEX 30 MIN: CPT

## 2022-06-02 PROCEDURE — 97110 THERAPEUTIC EXERCISES: CPT

## 2022-06-02 PROCEDURE — 97116 GAIT TRAINING THERAPY: CPT

## 2022-06-02 PROCEDURE — 6360000002 HC RX W HCPCS: Performed by: STUDENT IN AN ORGANIZED HEALTH CARE EDUCATION/TRAINING PROGRAM

## 2022-06-02 PROCEDURE — 97530 THERAPEUTIC ACTIVITIES: CPT

## 2022-06-02 PROCEDURE — 97167 OT EVAL HIGH COMPLEX 60 MIN: CPT

## 2022-06-02 PROCEDURE — 97535 SELF CARE MNGMENT TRAINING: CPT

## 2022-06-02 RX ORDER — METHOCARBAMOL 750 MG/1
750 TABLET, FILM COATED ORAL 3 TIMES DAILY PRN
Status: DISCONTINUED | OUTPATIENT
Start: 2022-06-02 | End: 2022-06-03

## 2022-06-02 RX ORDER — AMLODIPINE BESYLATE 10 MG/1
10 TABLET ORAL DAILY
Status: DISCONTINUED | OUTPATIENT
Start: 2022-06-03 | End: 2022-06-09 | Stop reason: HOSPADM

## 2022-06-02 RX ORDER — LOSARTAN POTASSIUM 50 MG/1
50 TABLET ORAL 2 TIMES DAILY
Status: DISCONTINUED | OUTPATIENT
Start: 2022-06-02 | End: 2022-06-09 | Stop reason: HOSPADM

## 2022-06-02 RX ORDER — DOCUSATE SODIUM 100 MG/1
100 CAPSULE, LIQUID FILLED ORAL DAILY
Status: DISCONTINUED | OUTPATIENT
Start: 2022-06-03 | End: 2022-06-09 | Stop reason: HOSPADM

## 2022-06-02 RX ORDER — ENOXAPARIN SODIUM 100 MG/ML
40 INJECTION SUBCUTANEOUS DAILY
Status: DISCONTINUED | OUTPATIENT
Start: 2022-06-02 | End: 2022-06-02 | Stop reason: SDUPTHER

## 2022-06-02 RX ORDER — HYDROCHLOROTHIAZIDE 25 MG/1
25 TABLET ORAL DAILY
Status: DISCONTINUED | OUTPATIENT
Start: 2022-06-03 | End: 2022-06-06

## 2022-06-02 RX ORDER — GINSENG 100 MG
CAPSULE ORAL 3 TIMES DAILY
Status: DISCONTINUED | OUTPATIENT
Start: 2022-06-02 | End: 2022-06-09 | Stop reason: HOSPADM

## 2022-06-02 RX ORDER — POLYETHYLENE GLYCOL 3350 17 G/17G
17 POWDER, FOR SOLUTION ORAL DAILY
Status: DISCONTINUED | OUTPATIENT
Start: 2022-06-02 | End: 2022-06-09 | Stop reason: HOSPADM

## 2022-06-02 RX ORDER — BISACODYL 10 MG
10 SUPPOSITORY, RECTAL RECTAL DAILY PRN
Status: DISCONTINUED | OUTPATIENT
Start: 2022-06-02 | End: 2022-06-09 | Stop reason: HOSPADM

## 2022-06-02 RX ORDER — ACETAMINOPHEN 325 MG/1
650 TABLET ORAL EVERY 4 HOURS PRN
Status: DISCONTINUED | OUTPATIENT
Start: 2022-06-02 | End: 2022-06-02

## 2022-06-02 RX ORDER — ACETAMINOPHEN 500 MG
1000 TABLET ORAL EVERY 8 HOURS SCHEDULED
Status: DISCONTINUED | OUTPATIENT
Start: 2022-06-02 | End: 2022-06-03

## 2022-06-02 RX ORDER — ENOXAPARIN SODIUM 100 MG/ML
30 INJECTION SUBCUTANEOUS 2 TIMES DAILY
Status: DISCONTINUED | OUTPATIENT
Start: 2022-06-02 | End: 2022-06-09 | Stop reason: HOSPADM

## 2022-06-02 RX ORDER — POLYETHYLENE GLYCOL 3350 17 G/17G
17 POWDER, FOR SOLUTION ORAL DAILY
Status: DISCONTINUED | OUTPATIENT
Start: 2022-06-03 | End: 2022-06-02 | Stop reason: SDUPTHER

## 2022-06-02 RX ORDER — OXYCODONE HYDROCHLORIDE 5 MG/1
5 TABLET ORAL EVERY 6 HOURS PRN
Status: DISCONTINUED | OUTPATIENT
Start: 2022-06-02 | End: 2022-06-09 | Stop reason: HOSPADM

## 2022-06-02 RX ORDER — CARVEDILOL 25 MG/1
25 TABLET ORAL 2 TIMES DAILY WITH MEALS
Status: DISCONTINUED | OUTPATIENT
Start: 2022-06-02 | End: 2022-06-09 | Stop reason: HOSPADM

## 2022-06-02 RX ORDER — OXYCODONE HYDROCHLORIDE 5 MG/1
5 TABLET ORAL EVERY 12 HOURS PRN
Status: DISCONTINUED | OUTPATIENT
Start: 2022-06-02 | End: 2022-06-02

## 2022-06-02 RX ORDER — LIDOCAINE 4 G/G
1 PATCH TOPICAL DAILY
Status: DISCONTINUED | OUTPATIENT
Start: 2022-06-03 | End: 2022-06-09 | Stop reason: HOSPADM

## 2022-06-02 RX ORDER — SENNA PLUS 8.6 MG/1
2 TABLET ORAL DAILY PRN
Status: DISCONTINUED | OUTPATIENT
Start: 2022-06-02 | End: 2022-06-09 | Stop reason: HOSPADM

## 2022-06-02 RX ADMIN — ACETAMINOPHEN 1000 MG: 325 TABLET ORAL at 05:58

## 2022-06-02 RX ADMIN — CARVEDILOL 25 MG: 12.5 TABLET, FILM COATED ORAL at 08:21

## 2022-06-02 RX ADMIN — ACETAMINOPHEN 1000 MG: 500 TABLET ORAL at 21:12

## 2022-06-02 RX ADMIN — CARVEDILOL 25 MG: 25 TABLET, FILM COATED ORAL at 18:38

## 2022-06-02 RX ADMIN — LOSARTAN POTASSIUM 50 MG: 50 TABLET, FILM COATED ORAL at 08:21

## 2022-06-02 RX ADMIN — OXYCODONE HYDROCHLORIDE 5 MG: 5 TABLET ORAL at 13:30

## 2022-06-02 RX ADMIN — BACITRACIN: 500 OINTMENT TOPICAL at 08:28

## 2022-06-02 RX ADMIN — ENOXAPARIN SODIUM 30 MG: 100 INJECTION SUBCUTANEOUS at 21:13

## 2022-06-02 RX ADMIN — OXYCODONE HYDROCHLORIDE 5 MG: 5 TABLET ORAL at 21:12

## 2022-06-02 RX ADMIN — METHOCARBAMOL TABLETS 750 MG: 750 TABLET, COATED ORAL at 05:57

## 2022-06-02 RX ADMIN — LOSARTAN POTASSIUM 50 MG: 50 TABLET, FILM COATED ORAL at 21:13

## 2022-06-02 RX ADMIN — ENOXAPARIN SODIUM 30 MG: 100 INJECTION SUBCUTANEOUS at 08:20

## 2022-06-02 RX ADMIN — HYDROCHLOROTHIAZIDE 25 MG: 25 TABLET ORAL at 08:21

## 2022-06-02 RX ADMIN — ACETAMINOPHEN 1000 MG: 500 TABLET ORAL at 13:31

## 2022-06-02 RX ADMIN — AMLODIPINE BESYLATE 10 MG: 10 TABLET ORAL at 08:21

## 2022-06-02 ASSESSMENT — PAIN SCALES - GENERAL
PAINLEVEL_OUTOF10: 6
PAINLEVEL_OUTOF10: 7
PAINLEVEL_OUTOF10: 5
PAINLEVEL_OUTOF10: 6

## 2022-06-02 ASSESSMENT — PAIN SCALES - WONG BAKER: WONGBAKER_NUMERICALRESPONSE: 0

## 2022-06-02 ASSESSMENT — PAIN DESCRIPTION - LOCATION
LOCATION: HIP;WRIST
LOCATION: HIP
LOCATION: HIP

## 2022-06-02 ASSESSMENT — PAIN DESCRIPTION - ORIENTATION
ORIENTATION: RIGHT

## 2022-06-02 ASSESSMENT — PAIN DESCRIPTION - DESCRIPTORS: DESCRIPTORS: ACHING

## 2022-06-02 ASSESSMENT — PAIN - FUNCTIONAL ASSESSMENT: PAIN_FUNCTIONAL_ASSESSMENT: ACTIVITIES ARE NOT PREVENTED

## 2022-06-02 NOTE — PROGRESS NOTES
Physical Therapy  Facility/Department: [facility]  Rehabilitation Physical Therapy Treatment Note    NAME: Anna Galvan  : 1975 (48 y.o.)  MRN: [id number]  CODE STATUS: Full Code    Date of Service: 22       Restrictions:  Restrictions/Precautions: Weight Bearing; Fall Risk  Lower Extremity Weight Bearing Restrictions  Right Lower Extremity Weight Bearing: Toe Touch Weight Bearing     SUBJECTIVE  Subjective  Subjective: Patient laying in bed upon arrival. RN apporved therapy after hypotensive in the shower. Patient agreeable to therapy   Pain Assessment  Pain Assessment: 0-10  Pain Level: 6  Pain Location: Hip  Pain Orientation: Right        Post Treatment Pain Screening  Pain Assessment  Pain Assessment: 0-10  Pain Level: 6  Pain Location: Hip  Pain Orientation: Right      OBJECTIVE  Cognition  Overall Cognitive Status: WNL  Orientation  Overall Orientation Status: Within Normal Limits    Functional Mobility  Roll Left  Assistance Level: Minimal assistance  Supine to Sit  Assistance Level: Minimal assistance  Scooting  Assistance Level: Stand by assist  Transfers  Surface: To bed; Wheelchair;From bed  Additional Factors: Mat raised  Device: Walker  Sit to Stand  Assistance Level: Contact guard assist  Stand to Sit  Assistance Level: Contact guard assist      Environmental Mobility  Ambulation  Surface: Level surface  Device: Rolling walker  Distance: 12ft  Activity: Within Unit  Activity Comments: patient kept R leg off of the ground while ambulating.  Patient would place foot down during a standing rest break   Additional Factors: Increased time to complete  Assistance Level: Contact guard assist;Minimal assistance  Gait Deviations: Slow carlota             PT Exercises  Resistive Exercises: seated B LE exercises x20 reps with #1 on R LE and #2 on L LE and green tband   Circulation/Endurance Exercises: seated UBE 5 minutes FWD/BWD       ASSESSMENT/PROGRESS TOWARDS GOALS       Assessment  Activity Tolerance: Patient tolerated treatment well  Discharge Recommendations: Home with assist PRN;Patient would benefit from continued therapy after discharge  PT Equipment Recommendations  Equipment Needed: Yes  Mobility Devices: Walker    Goals  Patient Goals   Patient goals : to get home and be as independent as able  Short Term Goals  Time Frame for Short term goals: 5 days  Short term goal 1: perform bed mobility SBA from flat surface  Short term goal 2: transfer STS SBA c Least restrictive assistive device from varied surface heights. Short term goal 3: Pt to amb  75'-100' on level surfaces, CGA with device. Short term goal 4: Pt to tolerate standing up to 5 minutes 1-2 UE support, CGA for safety. Short term goal 5: Pt to improve posture and sitting balance to GOOD (Static/dynamic) to progress to higher level tasks. Long Term Goals  Time Frame for Long term goals : 10-14 days  Long term goal 1: Perform bed mobility c Mod Indep from flat surface  Long term goal 2: transfer STS Mod indep c Least restrictive assistive device from varied surfaces and heights to simulate home environment. Long term goal 3: Pt to ambulate 150ft on level and uneven surfaces with device to be able to attend [employer]. Long term goal 4: Pt to tolerate standing up to 8 minutes with Good Balance, Mod indep  Long term goal 5: pt ambulate 3 stairs with use of R railing c CGA   Long term goal 6: Pt to propel w/c on varied surfaces, MOD I up to 250'. Long term goal 7: Pt to perform 2MWT with device 75' on level surface with device, MOD i. Long term goal 8: Pt to improve BLE strength by 1 MMG. Long term goal 9: Pt to improve standing balance (Static/dynamic) to GOOD- to reduce risk for falls. Long term goal 10: Pt to complete family training and demo good technique for HEP.     PLAN OF CARE/SAFETY  Plan  Plan:  minutes of therapy at least 5 out of 7 days a week  Specific Instructions for Next Treatment: progress gait, standing balance, endurance with pain control  Current Treatment Recommendations: Strengthening;Balance training;Functional mobility training;Transfer training; Endurance training;Gait training;Home exercise program;Safety education & training;Patient/Caregiver education & training;Equipment evaluation, education, & procurement;ROM;Wheelchair mobility training;Stair training;Pain management;Positioning  Safety Devices  Type of Devices: Call light within reach;Nurse notified;Gait belt; All fall risk precautions in place; Left in bed  Restraints  Restraints Initially in Place: No       06/02/22 1308   PT Individual Minutes   Time In 1439   Time Out 1540   Minutes CLARIBEL Garcia, 06/02/22 at 4:02 PM
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
## 2022-06-02 NOTE — CARE COORDINATION
Discharge [facility] Case Management Department  Written by: Kacey Reynaga RN    Patient Name: Del Iglesias  Attending Provider: Emmanuel Jalloh MD  Admit Date: 2022  8:46 PM  MRN: [id number]  Account: [de-identified]                     : 1975  Discharge Date: 2022      Disposition: TCC, to [address] [place] [address] via stretcher per Darien Fernandez RN

## 2022-06-02 NOTE — PROGRESS NOTES
Report called to Bronson Servin RN at [facility].   All questions answered and unit number given    Transport picked patient up at [address] [place] [address].

## 2022-06-02 NOTE — PROGRESS NOTES
Spoke with Dr. Bette Bruno via telephone. New order received to change Anabell order to 5 mg po q 6 hrs prn.
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
## 2022-06-02 NOTE — PROGRESS NOTES
Patient states his pain is not controlled at this time. Writer sent Perfect Serve message to Dr. Rayna Cole. Awaiting return call.
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
## 2022-06-02 NOTE — PROGRESS NOTES
ACUTE REHABILITATION ADMISSION    Patient admitted to the Inpatient Rehabilitation Unit via Stretcher at 1015     Patient oriented to room, unit and fall prevention safety measures. Education provided on the rehabilitation routine: three hours of therapy five days per week. Admitting medication orders compared with acute stay medications; home medication list reviewed with patient/family. Medication issues identified Yes  Medication issue: no     Admitting medication orders reviewed with Acute Rehab PM&R Physician: Cassy Gibson. Carmelina Morelos MD    Skin assessment performed by Laila Alcantar,  all active alterations in skin integrity, wounds, lines, drains and airways assessed, measured, recorded and reconciled. Refer to [facility] avatar and LDA flowsheet for additional information. Wound care consult order needed for complex wounds or pressure injuries? No If yes, contact physician for order. Bladder and Bowel Function Assessment:  1. Prior history of bladder problems: no problems with bladder  2. Number of pads used per day: 1  3. Frequency of night time voiding: 3 times  4. Fluid intake volume and pattern:   5. Last BM: 5/31  6. Prior history of bowel problems: No       Care plan was created with patient's input and goals were agreed upon. Admission folder with the following documents provided:  1. \"[facility] Individualized Disclosure Statement\"  2. \"Data Collection Information Summary for Patients in Inpatient Rehabilitation Facilities\"  3. \"Privacy Act Statement - Health Care Records\"    Please refer to the admission navigator for further information. Dr. Mariely Mixon consulted for medical management.
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
## 2022-06-02 NOTE — CARE COORDINATION
Patient Health Questionnaire-9 (PHQ-9)    Over the last 2 weeks, how often have you been bothered by any of the following problems? 1. Little Interest or pleasure in doing things? [x] Not at all  [] Several Days  [] More than half the day  []  Nearly every day    2. Feeling down, depressed or hopeless? [x] Not at all  [] Several Days  [] More than half the day  []  Nearly every day    3. Trouble falling or staying asleep, or sleeping too much? [x] Not at all  [] Several Days  [] More than half the day  []  Nearly every day    4. Feeling tired or having little energy? [x] Not at all  [] Several Days  [] More than half the day  []  Nearly every day    5. Poor apettite or overeating? [x] Not at all  [] Several Days  [] More than half the day  []  Nearly every day    6. Feeling bad about yourself-or that you are a failure or have let yourself or your family down? [x] Not at all  [] Several Days  [] More than half the day  []  Nearly every day    7. Trouble concentrating on things, such as reading the newspaper or watching television? [x] Not at all  [] Several Days  [] More than half the day  []  Nearly every day    8. Moving or speaking so slowly that other people could have noticed? Or the opposite-being so fidgety or restless that you have been moving around a lot more than usual?   [x] Not at all  [] Several Days  [] More than half the day  []  Nearly every day    9. Thoughts that you would be better off dead or of hurting yourself in some way? [x] Not at all  [] Several Days  [] More than half the day  []  Nearly every day    Total Scor0    If you checked off any problems, how difficult have these problems made it for you to do your work, take care of things at home, or get along with other people?    [x] Not difficult at all  [] Somewhat Difficult  [] Very Difficult  []  Extremely Difficult

## 2022-06-02 NOTE — PLAN OF CARE
Problem: Discharge Planning  Goal: Discharge to home or other facility with appropriate resources  6/2/2022 0530 by Paramjit Matson RN  Outcome: Progressing  6/1/2022 1816 by Mitch Patel RN  Outcome: Progressing     Problem: Skin/Tissue Integrity  Goal: Absence of new skin breakdown  Description: 1. Monitor for areas of redness and/or skin breakdown  2. Assess vascular access sites hourly  3. Every 4-6 hours minimum:  Change oxygen saturation probe site  4. Every 4-6 hours:  If on nasal continuous positive airway pressure, respiratory therapy assess nares and determine need for appliance change or resting period.   6/2/2022 0530 by Paramjit Matson RN  Outcome: Progressing  6/1/2022 1816 by Mitch Patel RN  Outcome: Progressing     Problem: ABCDS Injury Assessment  Goal: Absence of physical injury  6/2/2022 0530 by Paramjit Matson RN  Outcome: Progressing  6/1/2022 1816 by Mitch Patel RN  Outcome: Progressing     Problem: Safety - Adult  Goal: Free from fall injury  6/2/2022 0530 by Paramjit Matson RN  Outcome: Progressing  6/1/2022 1816 by Mitch Patel RN  Outcome: Progressing     Problem: Pain  Goal: Verbalizes/displays adequate comfort level or baseline comfort level  6/2/2022 0530 by Paramjit Matson RN  Outcome: Progressing  6/1/2022 1816 by Mitch Patel RN  Outcome: Progressing
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
## 2022-06-03 LAB
ABSOLUTE EOS #: 0.4 K/UL (ref 0–0.4)
ABSOLUTE LYMPH #: 1.61 K/UL (ref 1–4.8)
ABSOLUTE MONO #: 0.87 K/UL (ref 0.1–1.3)
ALBUMIN SERPL-MCNC: 3.3 G/DL (ref 3.5–5.2)
ALP BLD-CCNC: 99 U/L (ref 40–129)
ALT SERPL-CCNC: 47 U/L (ref 5–41)
ANION GAP SERPL CALCULATED.3IONS-SCNC: 11 MMOL/L (ref 9–17)
AST SERPL-CCNC: 40 U/L
BASOPHILS # BLD: 0 % (ref 0–2)
BASOPHILS ABSOLUTE: 0 K/UL (ref 0–0.2)
BILIRUB SERPL-MCNC: 0.7 MG/DL (ref 0.3–1.2)
BILIRUBIN DIRECT: 0.16 MG/DL
BILIRUBIN, INDIRECT: 0.54 MG/DL (ref 0–1)
BUN BLDV-MCNC: 20 MG/DL (ref 6–20)
CALCIUM SERPL-MCNC: 9.2 MG/DL (ref 8.6–10.4)
CHLORIDE BLD-SCNC: 103 MMOL/L (ref 98–107)
CO2: 22 MMOL/L (ref 20–31)
CREAT SERPL-MCNC: 0.9 MG/DL (ref 0.7–1.2)
EOSINOPHILS RELATIVE PERCENT: 6 % (ref 0–4)
GFR AFRICAN AMERICAN: >60 ML/MIN
GFR NON-AFRICAN AMERICAN: >60 ML/MIN
GFR SERPL CREATININE-BSD FRML MDRD: NORMAL ML/MIN/{1.73_M2}
GLUCOSE BLD-MCNC: 96 MG/DL (ref 70–99)
HCT VFR BLD CALC: 27.7 % (ref 41–53)
HEMOGLOBIN: 8.9 G/DL (ref 13.5–17.5)
LYMPHOCYTES # BLD: 24 % (ref 24–44)
MCH RBC QN AUTO: 28.7 PG (ref 26–34)
MCHC RBC AUTO-ENTMCNC: 32.3 G/DL (ref 31–37)
MCV RBC AUTO: 88.8 FL (ref 80–100)
MONOCYTES # BLD: 13 % (ref 1–7)
MORPHOLOGY: NORMAL
PDW BLD-RTO: 15.3 % (ref 11.5–14.9)
PLATELET # BLD: 492 K/UL (ref 150–450)
PMV BLD AUTO: 5.8 FL (ref 6–12)
POTASSIUM SERPL-SCNC: 3.9 MMOL/L (ref 3.7–5.3)
RBC # BLD: 3.12 M/UL (ref 4.5–5.9)
SEG NEUTROPHILS: 57 % (ref 36–66)
SEGMENTED NEUTROPHILS ABSOLUTE COUNT: 3.82 K/UL (ref 1.3–9.1)
SODIUM BLD-SCNC: 136 MMOL/L (ref 135–144)
TOTAL PROTEIN: 6.7 G/DL (ref 6.4–8.3)
WBC # BLD: 6.7 K/UL (ref 3.5–11)

## 2022-06-03 PROCEDURE — 80048 BASIC METABOLIC PNL TOTAL CA: CPT

## 2022-06-03 PROCEDURE — 97110 THERAPEUTIC EXERCISES: CPT

## 2022-06-03 PROCEDURE — 6360000002 HC RX W HCPCS: Performed by: NURSE PRACTITIONER

## 2022-06-03 PROCEDURE — 6370000000 HC RX 637 (ALT 250 FOR IP): Performed by: PHYSICAL MEDICINE & REHABILITATION

## 2022-06-03 PROCEDURE — 1180000000 HC REHAB R&B

## 2022-06-03 PROCEDURE — 85025 COMPLETE CBC W/AUTO DIFF WBC: CPT

## 2022-06-03 PROCEDURE — 99232 SBSQ HOSP IP/OBS MODERATE 35: CPT | Performed by: INTERNAL MEDICINE

## 2022-06-03 PROCEDURE — 80076 HEPATIC FUNCTION PANEL: CPT

## 2022-06-03 PROCEDURE — 97535 SELF CARE MNGMENT TRAINING: CPT

## 2022-06-03 PROCEDURE — 99223 1ST HOSP IP/OBS HIGH 75: CPT | Performed by: PHYSICAL MEDICINE & REHABILITATION

## 2022-06-03 PROCEDURE — 6370000000 HC RX 637 (ALT 250 FOR IP): Performed by: NURSE PRACTITIONER

## 2022-06-03 PROCEDURE — 36415 COLL VENOUS BLD VENIPUNCTURE: CPT

## 2022-06-03 PROCEDURE — 97542 WHEELCHAIR MNGMENT TRAINING: CPT

## 2022-06-03 PROCEDURE — 97530 THERAPEUTIC ACTIVITIES: CPT

## 2022-06-03 PROCEDURE — 97116 GAIT TRAINING THERAPY: CPT

## 2022-06-03 RX ORDER — METHOCARBAMOL 500 MG/1
500 TABLET, FILM COATED ORAL 3 TIMES DAILY PRN
Status: DISCONTINUED | OUTPATIENT
Start: 2022-06-03 | End: 2022-06-09 | Stop reason: HOSPADM

## 2022-06-03 RX ORDER — ACETAMINOPHEN 325 MG/1
650 TABLET ORAL EVERY 8 HOURS PRN
Status: DISCONTINUED | OUTPATIENT
Start: 2022-06-03 | End: 2022-06-09 | Stop reason: HOSPADM

## 2022-06-03 RX ADMIN — AMLODIPINE BESYLATE 10 MG: 10 TABLET ORAL at 10:27

## 2022-06-03 RX ADMIN — METHOCARBAMOL TABLETS 500 MG: 500 TABLET, COATED ORAL at 20:02

## 2022-06-03 RX ADMIN — METHOCARBAMOL 750 MG: 750 TABLET, FILM COATED ORAL at 12:20

## 2022-06-03 RX ADMIN — CARVEDILOL 25 MG: 25 TABLET, FILM COATED ORAL at 10:25

## 2022-06-03 RX ADMIN — LOSARTAN POTASSIUM 50 MG: 50 TABLET, FILM COATED ORAL at 20:02

## 2022-06-03 RX ADMIN — LOSARTAN POTASSIUM 50 MG: 50 TABLET, FILM COATED ORAL at 10:29

## 2022-06-03 RX ADMIN — ACETAMINOPHEN 1000 MG: 500 TABLET ORAL at 05:01

## 2022-06-03 RX ADMIN — BACITRACIN: 500 OINTMENT TOPICAL at 20:04

## 2022-06-03 RX ADMIN — ENOXAPARIN SODIUM 30 MG: 100 INJECTION SUBCUTANEOUS at 10:26

## 2022-06-03 RX ADMIN — CARVEDILOL 25 MG: 25 TABLET, FILM COATED ORAL at 18:01

## 2022-06-03 RX ADMIN — ENOXAPARIN SODIUM 30 MG: 100 INJECTION SUBCUTANEOUS at 20:02

## 2022-06-03 RX ADMIN — METHOCARBAMOL 750 MG: 750 TABLET, FILM COATED ORAL at 05:01

## 2022-06-03 RX ADMIN — OXYCODONE HYDROCHLORIDE 5 MG: 5 TABLET ORAL at 05:01

## 2022-06-03 RX ADMIN — ACETAMINOPHEN 1000 MG: 500 TABLET ORAL at 12:20

## 2022-06-03 RX ADMIN — DOCUSATE SODIUM 100 MG: 100 CAPSULE, LIQUID FILLED ORAL at 10:26

## 2022-06-03 RX ADMIN — HYDROCHLOROTHIAZIDE 25 MG: 25 TABLET ORAL at 10:28

## 2022-06-03 RX ADMIN — OXYCODONE HYDROCHLORIDE 5 MG: 5 TABLET ORAL at 20:02

## 2022-06-03 RX ADMIN — OXYCODONE HYDROCHLORIDE 5 MG: 5 TABLET ORAL at 12:20

## 2022-06-03 ASSESSMENT — PAIN DESCRIPTION - ORIENTATION
ORIENTATION: RIGHT

## 2022-06-03 ASSESSMENT — PAIN DESCRIPTION - FREQUENCY
FREQUENCY: INTERMITTENT
FREQUENCY: INTERMITTENT

## 2022-06-03 ASSESSMENT — PAIN DESCRIPTION - ONSET
ONSET: ON-GOING
ONSET: ON-GOING

## 2022-06-03 ASSESSMENT — PAIN SCALES - GENERAL
PAINLEVEL_OUTOF10: 7
PAINLEVEL_OUTOF10: 6
PAINLEVEL_OUTOF10: 6
PAINLEVEL_OUTOF10: 0
PAINLEVEL_OUTOF10: 7
PAINLEVEL_OUTOF10: 7

## 2022-06-03 ASSESSMENT — PAIN DESCRIPTION - PAIN TYPE
TYPE: ACUTE PAIN;SURGICAL PAIN
TYPE: ACUTE PAIN;SURGICAL PAIN

## 2022-06-03 ASSESSMENT — PAIN - FUNCTIONAL ASSESSMENT
PAIN_FUNCTIONAL_ASSESSMENT: PREVENTS OR INTERFERES SOME ACTIVE ACTIVITIES AND ADLS
PAIN_FUNCTIONAL_ASSESSMENT: ACTIVITIES ARE NOT PREVENTED

## 2022-06-03 ASSESSMENT — PAIN DESCRIPTION - LOCATION
LOCATION: HIP;WRIST
LOCATION: HIP;RIB CAGE
LOCATION: RIB CAGE;HIP

## 2022-06-03 ASSESSMENT — PAIN SCALES - WONG BAKER: WONGBAKER_NUMERICALRESPONSE: 0
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
## 2022-06-03 NOTE — FLOWSHEET NOTE
06/03/22 1334   Encounter Summary   Encounter Overview/Reason  Spiritual/Emotional Needs   Service Provided For: Patient   Referral/Consult From: Rounding   Complexity of Encounter Low   Spiritual/Emotional needs   Type Spiritual Support   Assessment/Intervention/Outcome   Assessment Unable to assess  (patient sleeping)   Intervention Prayer (assurance of)/Holiday

## 2022-06-03 NOTE — PROGRESS NOTES
Physical Therapy  Facility/Department: [facility]  Rehabilitation Physical Therapy Treatment Note    NAME: Callum Arellano  : 1975 (48 y.o.)  MRN: [id number]  CODE STATUS: Full Code    Date of Service: 6/3/22       Restrictions:  Restrictions/Precautions: Weight Bearing; Fall Risk  Lower Extremity Weight Bearing Restrictions  Right Lower Extremity Weight Bearing: Toe Touch Weight Bearing     SUBJECTIVE  Subjective  Subjective: Patient stated he had a rough night and had increased pain   Pain Assessment  Pain Assessment: 0-10  Pain Level: 6  Pain Location: Hip;Wrist  Pain Orientation: Right        Post Treatment Pain Screening  Pain Assessment  Pain Assessment: 0-10  Pain Level: 6  Pain Location: Hip;Wrist  Pain Orientation: Right      OBJECTIVE  Orientation  Overall Orientation Status: Within Normal Limits    Functional Mobility  Bed Mobility  Overall Assistance Level: Minimal Assistance  Bridging  Assistance Level: Stand by assist  Supine to Sit  Assistance Level: Minimal assistance  Transfers  Surface: To bed; Wheelchair;From bed  Device: Walker  Sit to Stand  Assistance Level: Contact guard assist  Stand to Sit  Assistance Level: Contact guard assist      Environmental Mobility  Ambulation  Surface: Level surface  Device: Rolling walker  Distance: 31ft, 26ft AM (41ft PM)  Activity: Within Unit  Activity Comments: patient kept R leg off of the ground while ambulating. Patient would place foot down during a standing rest break   Additional Factors: Increased time to complete  Assistance Level: Contact guard assist  Gait Deviations: Slow carlota  Skilled Clinical Factors: WC follow  Stairs  Stair Height: 4''  Device: Rolling walker  Number of Stairs: 3  Additional Factors: Set-up; Verbal cues  Assistance Level: Minimal assistance;Contact guard assist  Skilled Clinical Factors - Comments: patient ascended step backwards   Wheelchair  Surface: Level surface  Device: Standard wheelchair  Additional Factors: Verbal cues  Assistance Level for Propulsion: Stand by assist  Propulsion Method: Bilateral upper extremities  Propulsion Distance: 179ft x2             PT Exercises  Resistive Exercises: seated B LE exercises x20 reps with #1 on R LE and #2 on L LE and green tband   Circulation/Endurance Exercises: seated UBE 5 minutes FWD/BWD. NuStep L1 x12 minutes while keeoing TTWB status       ASSESSMENT/PROGRESS TOWARDS GOALS       Assessment  Activity Tolerance: Patient tolerated treatment well  Discharge Recommendations: Home with assist PRN;Patient would benefit from continued therapy after discharge  PT Equipment Recommendations  Equipment Needed: Yes  Mobility Devices: Walker    Goals  Patient Goals   Patient goals : to get home and be as independent as able  Short Term Goals  Time Frame for Short term goals: 5 days  Short term goal 1: perform bed mobility SBA from flat surface  Short term goal 2: transfer STS SBA c Least restrictive assistive device from varied surface heights. Short term goal 3: Pt to amb  75'-100' on level surfaces, CGA with device. Short term goal 4: Pt to tolerate standing up to 5 minutes 1-2 UE support, CGA for safety. Short term goal 5: Pt to improve posture and sitting balance to GOOD (Static/dynamic) to progress to higher level tasks. Long Term Goals  Time Frame for Long term goals : 10-14 days  Long term goal 1: Perform bed mobility c Mod Indep from flat surface  Long term goal 2: transfer STS Mod indep c Least restrictive assistive device from varied surfaces and heights to simulate home environment. Long term goal 3: Pt to ambulate 150ft on level and uneven surfaces with device to be able to attend [employer]. Long term goal 4: Pt to tolerate standing up to 8 minutes with Good Balance, Mod indep  Long term goal 5: pt ambulate 3 stairs with use of R railing c CGA   Long term goal 6: Pt to propel w/c on varied surfaces, MOD I up to 250'.   Long term goal 7: Pt to perform 2MWT with device 75'
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
## 2022-06-03 NOTE — PROGRESS NOTES
Occupational Therapy  Facility/Department: [facility]  Rehabilitation Occupational Therapy Daily Treatment Note    Date: 6/3/22  Patient Name: Kathy Syed       Room: 2612/2612-01  MRN: [id number]  Account: [de-identified]   : 1975  (48 y.o.) Gender: male       Past Medical History:  has a past medical history of Asthma and Hypertension. Past Surgical History:   has a past surgical history that includes Finger replantation; Bony pelvis surgery (Right, 2022); and Pelvic fracture surgery (Right, 2022). Restrictions  Restrictions/Precautions: Weight Bearing; Fall Risk  Implants present? : Metal implants (ORIF R acetabulum)  Other position/activity restrictions: OK to shower per ortho Dr. Anayeli Aguilar or Orthoses?: No    Subjective  Subjective: \"Well I guess I did, didn't I?\"  Pain Level: 7 (in AM; decreased to 5-6/10 in PM)  Pain Location: Hip;Rib cage  Pain Orientation: Right  Restrictions/Precautions: Weight Bearing; Fall Risk        Pain Assessment  Pain Assessment: 0-10  Pain Level: 7 (in AM; decreased to 5-6/10 in PM)  Pain Location: Hip,Rib cage  Pain Orientation: Right  Functional Pain Assessment: Prevents or interferes some active activities and ADLs  Pain Type: Acute pain,Surgical pain  Pain Frequency: Intermittent (Worse with activity)  Pain Onset: On-going  Non-Pharmaceutical Pain Intervention(s): Ambulation/Increased Activity,Distraction,Emotional support  Response to Pain Intervention: None (pain unchanged or no improvement)  Side Effects: No reported side effects    Objective  Cognition  Overall Cognitive Status: WNL  Patient affect[de-identified] Normal  Orientation  Overall Orientation Status: Within Normal Limits         ADL  Feeding  Assistance Level:  Independent    Grooming/Oral Hygiene  Assistance Level: Set-up  Skilled Clinical Factors: w/c level at sink    Upper Extremity Bathing  Skilled Clinical Factors: Pt declined stating he took a shower yesterday and did not feel the need to wash up today. Lower Extremity Bathing  Skilled Clinical Factors: Pt declined stating he took a shower yesterday and did not feel the need to wash up today. Upper Extremity Dressing  Assistance Level: Set-up  Skilled Clinical Factors: Don/doff [place] shirt    Lower Extremity Dressing  Assistance Level: Stand by assist  Skilled Clinical Factors: Educated on technique for don/doffing pants over feet;  Completed partial stand at sink to pull up over hips    Putting On/Taking Off Footwear  Equipment Provided: Reachers;Sock aid  Assistance Level: Set-up  Skilled Clinical Factors: OT provided education on use of AE and pt return demo'd good understanding, completing tasks without further A    Toileting  Assistance Level: Set-up  Skilled Clinical Factors: AM: Pt used HH urinal, PM: Pt pull w/c up to toilet and was able to urinate from seated position; Pt declined to stand at the toilet 2* pain in R hip, no need for BM this date    Light Housekeeping  Light Housekeeping Level: Wheelchair  Light Housekeeping Level of Assistance: Stand by assistance  Light Housekeeping: Pt loaded clothing/detergent into washer and adjusted settings from w/c level     Functional Mobility  Device: Wheelchair  Activity: To/From bathroom  Assistance Level: Minimal assistance  Skilled Clinical Factors: Occasional A for maneuvering around objects and reversing out of bathroom  Sit to Supine  Assistance Level: Minimal assistance  Skilled Clinical Factors: A for RLE  Supine to Sit  Assistance Level: Minimal assistance  Skilled Clinical Factors: A for RLE  Scooting  Assistance Level: Supervision  Sit to Stand  Assistance Level: Stand by assist  Skilled Clinical Factors: Pt able to complete with increased time, cues for hand placement, and SBA to stabilize RW/wheelchair as needed  Stand to Sit  Assistance Level: Stand by assist  Skilled Clinical Factors: Pt requires increased time 2* pain  Bed To/From Chair  Technique: Stand step  Assistance Level: Stand by assist  Skilled Clinical Factors: w/RW; Pt generally maintains NWB to RLE despite being allowed TTWB  Stand Pivot  Assistance Level: Stand by assist  Skilled Clinical Factors: w/RW     Assessment  Assessment  Activity Tolerance: Patient tolerated treatment well  Discharge Recommendations: Home with assist PRN  Safety Devices  Safety Devices in place: Yes  Type of devices: Patient at risk for falls;Gait belt;Left in bed;Left in chair;Call light within reach (Left in bed in AM and in PT gym in PM)    Patient Education  Education  Education Given To: Patient  Education Provided: Role of Therapy;Plan of Care;Precautions; Safety;ADL Function;Mobility Training;Transfer Training;Equipment  Education Method: Demonstration;Verbal;Teach Back  Barriers to Learning: None  Education Outcome: Verbalized understanding;Demonstrated understanding    Plan  Plan  Times per Week: 5-7  Times per Day: Twice a day  Current Treatment Recommendations: Self-Care / ADL; Home management training;Strengthening;Balance training;Functional mobility training; Endurance training; Wheelchair mobility training;Pain management; Safety education & training;Patient/Caregiver education & training;Equipment evaluation, education, & procurement    Goals  Patient Goals   Patient goals : \"To walk with a walker with the pain level between 2-3\"  Short Term Goals  Time Frame for Short term goals: One week  Short Term Goal 1: Patient will verbalize/demontrate Good understanding of assistive equipment/durable medical equipment/modified techniques for increased IND with self-care. Short Term Goal 2: Patient will perform upper body bathing/dressing with setup. Short Term Goal 3: Patient will perform lower body bathing/dressing and toileting tasks with Minimal assist and Good compliance with TTWB R LE. Short Term Goal 4: Patient will verbalize/demonstrate Good understanding of TTWB R LE with regard to self-care.   Short Term Goal 5: Patient will actively participate in 30+ minutes of therapeutic exercise/functional activities to promote increased IND with self-care and mobility. Long Term Goals  Time Frame for Long term goals : By discharge  Long Term Goal 1: Patient will perform BADLs with Modified IND and Good compliance with TTWB R LE. Long Term Goal 2: Patient will perform functional mobility and transfers during self-care with Modified IND, least restrictive mobility device, and Good compliance with TTWB R LE. Long Term Goal 3: Patient will verbalize/demonstrate Good understanding of Fall Prevention Strategies for increased safety and IND.   Long Term Goal 4: Patient will perform simple meal prep/light housekeeping with modified IND.       06/03/22 1006 06/03/22 1007   OT Individual Minutes   Time In 1006 1403   Time Out [phone]   Minutes 60 3928 Yuli OTR/L

## 2022-06-04 PROCEDURE — 6370000000 HC RX 637 (ALT 250 FOR IP): Performed by: PHYSICAL MEDICINE & REHABILITATION

## 2022-06-04 PROCEDURE — 97530 THERAPEUTIC ACTIVITIES: CPT

## 2022-06-04 PROCEDURE — 6360000002 HC RX W HCPCS: Performed by: NURSE PRACTITIONER

## 2022-06-04 PROCEDURE — 97116 GAIT TRAINING THERAPY: CPT

## 2022-06-04 PROCEDURE — 99231 SBSQ HOSP IP/OBS SF/LOW 25: CPT | Performed by: INTERNAL MEDICINE

## 2022-06-04 PROCEDURE — 99232 SBSQ HOSP IP/OBS MODERATE 35: CPT | Performed by: PHYSICAL MEDICINE & REHABILITATION

## 2022-06-04 PROCEDURE — 97535 SELF CARE MNGMENT TRAINING: CPT

## 2022-06-04 PROCEDURE — 6370000000 HC RX 637 (ALT 250 FOR IP): Performed by: NURSE PRACTITIONER

## 2022-06-04 PROCEDURE — 97110 THERAPEUTIC EXERCISES: CPT

## 2022-06-04 PROCEDURE — 1180000000 HC REHAB R&B

## 2022-06-04 RX ADMIN — ACETAMINOPHEN 650 MG: 325 TABLET ORAL at 11:26

## 2022-06-04 RX ADMIN — CARVEDILOL 25 MG: 25 TABLET, FILM COATED ORAL at 18:10

## 2022-06-04 RX ADMIN — OXYCODONE HYDROCHLORIDE 5 MG: 5 TABLET ORAL at 17:55

## 2022-06-04 RX ADMIN — BACITRACIN: 500 OINTMENT TOPICAL at 20:47

## 2022-06-04 RX ADMIN — ENOXAPARIN SODIUM 30 MG: 100 INJECTION SUBCUTANEOUS at 20:45

## 2022-06-04 RX ADMIN — OXYCODONE HYDROCHLORIDE 5 MG: 5 TABLET ORAL at 02:04

## 2022-06-04 RX ADMIN — AMLODIPINE BESYLATE 10 MG: 10 TABLET ORAL at 08:25

## 2022-06-04 RX ADMIN — DOCUSATE SODIUM 100 MG: 100 CAPSULE, LIQUID FILLED ORAL at 08:25

## 2022-06-04 RX ADMIN — POLYETHYLENE GLYCOL 3350 17 G: 17 POWDER, FOR SOLUTION ORAL at 08:25

## 2022-06-04 RX ADMIN — LOSARTAN POTASSIUM 50 MG: 50 TABLET, FILM COATED ORAL at 08:25

## 2022-06-04 RX ADMIN — LOSARTAN POTASSIUM 50 MG: 50 TABLET, FILM COATED ORAL at 20:46

## 2022-06-04 RX ADMIN — CARVEDILOL 25 MG: 25 TABLET, FILM COATED ORAL at 08:25

## 2022-06-04 RX ADMIN — ACETAMINOPHEN 650 MG: 325 TABLET ORAL at 17:55

## 2022-06-04 RX ADMIN — ACETAMINOPHEN 650 MG: 325 TABLET ORAL at 20:46

## 2022-06-04 RX ADMIN — OXYCODONE HYDROCHLORIDE 5 MG: 5 TABLET ORAL at 10:00

## 2022-06-04 RX ADMIN — ENOXAPARIN SODIUM 30 MG: 100 INJECTION SUBCUTANEOUS at 08:25

## 2022-06-04 RX ADMIN — METHOCARBAMOL TABLETS 500 MG: 500 TABLET, COATED ORAL at 10:00

## 2022-06-04 RX ADMIN — METHOCARBAMOL TABLETS 500 MG: 500 TABLET, COATED ORAL at 20:46

## 2022-06-04 RX ADMIN — SENNOSIDES 17.2 MG: 8.6 TABLET, FILM COATED ORAL at 08:25

## 2022-06-04 RX ADMIN — HYDROCHLOROTHIAZIDE 25 MG: 25 TABLET ORAL at 08:25

## 2022-06-04 RX ADMIN — BACITRACIN: 500 OINTMENT TOPICAL at 14:28

## 2022-06-04 ASSESSMENT — PAIN DESCRIPTION - ORIENTATION
ORIENTATION: RIGHT
ORIENTATION: RIGHT

## 2022-06-04 ASSESSMENT — PAIN DESCRIPTION - ONSET: ONSET: ON-GOING

## 2022-06-04 ASSESSMENT — PAIN SCALES - WONG BAKER
WONGBAKER_NUMERICALRESPONSE: 0

## 2022-06-04 ASSESSMENT — PAIN SCALES - GENERAL
PAINLEVEL_OUTOF10: 7
PAINLEVEL_OUTOF10: 0
PAINLEVEL_OUTOF10: 6
PAINLEVEL_OUTOF10: 5
PAINLEVEL_OUTOF10: 2
PAINLEVEL_OUTOF10: 10
PAINLEVEL_OUTOF10: 0
PAINLEVEL_OUTOF10: 7

## 2022-06-04 ASSESSMENT — PAIN DESCRIPTION - PAIN TYPE
TYPE: SURGICAL PAIN;ACUTE PAIN
TYPE: SURGICAL PAIN

## 2022-06-04 ASSESSMENT — PAIN DESCRIPTION - LOCATION
LOCATION: HIP;RIB CAGE
LOCATION: HIP

## 2022-06-04 ASSESSMENT — PAIN DESCRIPTION - FREQUENCY: FREQUENCY: INTERMITTENT
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
## 2022-06-04 NOTE — PLAN OF CARE
Problem: Safety - Adult  Goal: Free from fall injury  6/4/2022 0215 by Oliver Yung RN  Outcome: Progressing  6/3/2022 1702 by Gaby Pond LPN  Outcome: Progressing     Problem: Skin/Tissue Integrity  Goal: Absence of new skin breakdown  Description: 1. Monitor for areas of redness and/or skin breakdown  2. Assess vascular access sites hourly  3. Every 4-6 hours minimum:  Change oxygen saturation probe site  4. Every 4-6 hours:  If on nasal continuous positive airway pressure, respiratory therapy assess nares and determine need for appliance change or resting period.   6/4/2022 0215 by Oliver Yung RN  Outcome: Progressing  6/3/2022 1702 by Gaby Pond LPN  Outcome: Progressing     Problem: ABCDS Injury Assessment  Goal: Absence of physical injury  6/4/2022 0215 by Oliver Yung RN  Outcome: Progressing  6/3/2022 1702 by Gaby Pond LPN  Outcome: Progressing     Problem: Pain  Goal: Verbalizes/displays adequate comfort level or baseline comfort level  6/4/2022 0215 by Oliver Yung RN  Outcome: Progressing  6/3/2022 1702 by Gaby Pond LPN  Outcome: Progressing

## 2022-06-04 NOTE — PROGRESS NOTES
Occupational Therapy  Facility/Department: [facility] ACUTE REHAB  Rehabilitation Occupational Therapy Daily Treatment Note    Date: 22  Patient Name: Santiago Cheema       Room: 2612/2612-01  MRN: [id number]  Account: [de-identified]   : 1975  (48 y.o.) Gender: male                     Past Medical History:  has a past medical history of Asthma and Hypertension. Past Surgical History:   has a past surgical history that includes Finger replantation; Bony pelvis surgery (Right, 2022); and Pelvic fracture surgery (Right, 2022). Restrictions  Restrictions/Precautions: Weight Bearing; Fall Risk  Implants present? : Metal implants (ORIF acetabulum)  Other position/activity restrictions: OK to shower per ortho Dr. Narda Hoang  Right Lower Extremity Weight Bearing: Toe Touch Weight Bearing  Required Braces or Orthoses?: No    Subjective  Subjective: \"I'm good. Just frustrated with the shots\". Pt pleasant and agreeable to therapy. Reports he completed dressing tasks already this AM.   Pain Level: 7  Pain Location: Hip;Rib cage  Pain Orientation: Right  Restrictions/Precautions: Weight Bearing; Fall Risk        Pain Assessment  Pain Assessment: 0-10  Pain Level: 7  Pain Location: Hip,Rib cage  Pain Orientation: Right    Objective     Cognition  Overall Cognitive Status: WNL  Orientation  Overall Orientation Status: Within Normal Limits         ADL  Grooming/Oral Hygiene  Assistance Level: Modified independent  Skilled Clinical Factors: w/c level at sink          Sit to Stand  Assistance Level: Stand by assist  Skilled Clinical Factors: Pt able to complete with increased time, cues for hand placement, and SBA to stabilize RW/wheelchair as needed  Stand to Sit  Assistance Level: Stand by assist  Skilled Clinical Factors: Pt requires increased time 2* pain     OT Exercises  Dynamic Sitting Balance Exercises: AM: Pt instruction in seated bean bag toss for facilitation of increased ease with anterior weight shifting for ease with LB dressing. Pt tolerates well with limited anterior leaning noted 2* increased pain in R hip. First attempts to complete in standing but is unable due to poor balance. Dynamic Standing Balance Exercises: PM: Pt instruction in standing tasks at tabletop for facilitation of increased tolerance and safety with functional standing tasks. Pt tolerates standing for trials of 3:45 and 2:45 with seated RB req due to increased pain/discomfort in R hip. Assessment  Assessment  Activity Tolerance: Patient tolerated treatment well;Patient limited by pain  Discharge Recommendations: Home with assist PRN  OT Equipment Recommendations  Equipment Needed:  (TBD)  Other: d/c planning: Pt will benefit from use of TTB for bathing tasks at home. Reports he may have a TTB at home and will have family check. Safety Devices  Safety Devices in place: Yes  Type of devices: Left in bed;Call light within reach; Patient at risk for falls    Patient Education  Education  Education Given To: Patient  Education Provided: Plan of Care;ADL Function;Transfer Training; Safety;Precautions; Energy Conservation  Education Method: Verbal;Demonstration  Barriers to Learning: None  Education Outcome: Verbalized understanding;Demonstrated understanding    Plan  Plan  Times per Week: 5-7  Times per Day: Twice a day  Current Treatment Recommendations: Self-Care / ADL; Home management training;Strengthening;Balance training;Functional mobility training; Endurance training; Wheelchair mobility training;Pain management; Safety education & training;Patient/Caregiver education & training;Equipment evaluation, education, & procurement    Goals  Patient Goals   Patient goals : \"To walk with a walker with the pain level between 2-3\"  Short Term Goals  Time Frame for Short term goals:  One week  Short Term Goal 1: Patient will verbalize/demontrate Good understanding of assistive equipment/durable medical equipment/modified techniques for increased IND with self-care. Short Term Goal 2: Patient will perform upper body bathing/dressing with setup. Short Term Goal 3: Patient will perform lower body bathing/dressing and toileting tasks with Minimal assist and Good compliance with TTWB R LE. Short Term Goal 4: Patient will verbalize/demonstrate Good understanding of TTWB R LE with regard to self-care. Short Term Goal 5: Patient will actively participate in 30+ minutes of therapeutic exercise/functional activities to promote increased IND with self-care and mobility. Long Term Goals  Time Frame for Long term goals : By discharge  Long Term Goal 1: Patient will perform BADLs with Modified IND and Good compliance with TTWB R LE. Long Term Goal 2: Patient will perform functional mobility and transfers during self-care with Modified IND, least restrictive mobility device, and Good compliance with TTWB R LE. Long Term Goal 3: Patient will verbalize/demonstrate Good understanding of Fall Prevention Strategies for increased safety and IND. Long Term Goal 4: Patient will perform simple meal prep/light housekeeping with modified IND.     AM-PAC Score                     06/04/22 1607 06/04/22 1608   OT Individual Minutes   Time In 1008 1300   Time Out [id number]   Minutes 61 [address], [place]/L
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
## 2022-06-05 PROBLEM — I10 ESSENTIAL HYPERTENSION: Status: ACTIVE | Noted: 2022-06-05

## 2022-06-05 LAB
HCT VFR BLD CALC: 25.1 % (ref 41–53)
HEMOGLOBIN: 8.9 G/DL (ref 13.5–17.5)
MCH RBC QN AUTO: 30.8 PG (ref 26–34)
MCHC RBC AUTO-ENTMCNC: 35.4 G/DL (ref 31–37)
MCV RBC AUTO: 87.1 FL (ref 80–100)
PDW BLD-RTO: 15.2 % (ref 11.5–14.9)
PLATELET # BLD: 408 K/UL (ref 150–450)
PMV BLD AUTO: 5.7 FL (ref 6–12)
RBC # BLD: 2.88 M/UL (ref 4.5–5.9)
WBC # BLD: 7.9 K/UL (ref 3.5–11)

## 2022-06-05 PROCEDURE — 1180000000 HC REHAB R&B

## 2022-06-05 PROCEDURE — 85027 COMPLETE CBC AUTOMATED: CPT

## 2022-06-05 PROCEDURE — 97530 THERAPEUTIC ACTIVITIES: CPT

## 2022-06-05 PROCEDURE — 97116 GAIT TRAINING THERAPY: CPT

## 2022-06-05 PROCEDURE — 99232 SBSQ HOSP IP/OBS MODERATE 35: CPT | Performed by: PHYSICAL MEDICINE & REHABILITATION

## 2022-06-05 PROCEDURE — 97110 THERAPEUTIC EXERCISES: CPT

## 2022-06-05 PROCEDURE — 6360000002 HC RX W HCPCS: Performed by: NURSE PRACTITIONER

## 2022-06-05 PROCEDURE — 6370000000 HC RX 637 (ALT 250 FOR IP): Performed by: NURSE PRACTITIONER

## 2022-06-05 PROCEDURE — 6370000000 HC RX 637 (ALT 250 FOR IP): Performed by: PHYSICAL MEDICINE & REHABILITATION

## 2022-06-05 PROCEDURE — 99232 SBSQ HOSP IP/OBS MODERATE 35: CPT | Performed by: INTERNAL MEDICINE

## 2022-06-05 PROCEDURE — 97535 SELF CARE MNGMENT TRAINING: CPT

## 2022-06-05 PROCEDURE — 36415 COLL VENOUS BLD VENIPUNCTURE: CPT

## 2022-06-05 RX ADMIN — OXYCODONE HYDROCHLORIDE 5 MG: 5 TABLET ORAL at 22:33

## 2022-06-05 RX ADMIN — LOSARTAN POTASSIUM 50 MG: 50 TABLET, FILM COATED ORAL at 22:34

## 2022-06-05 RX ADMIN — DOCUSATE SODIUM 100 MG: 100 CAPSULE, LIQUID FILLED ORAL at 08:12

## 2022-06-05 RX ADMIN — POLYETHYLENE GLYCOL 3350 17 G: 17 POWDER, FOR SOLUTION ORAL at 08:12

## 2022-06-05 RX ADMIN — OXYCODONE HYDROCHLORIDE 5 MG: 5 TABLET ORAL at 16:41

## 2022-06-05 RX ADMIN — CARVEDILOL 25 MG: 25 TABLET, FILM COATED ORAL at 08:12

## 2022-06-05 RX ADMIN — ENOXAPARIN SODIUM 30 MG: 100 INJECTION SUBCUTANEOUS at 22:34

## 2022-06-05 RX ADMIN — OXYCODONE HYDROCHLORIDE 5 MG: 5 TABLET ORAL at 05:52

## 2022-06-05 RX ADMIN — ENOXAPARIN SODIUM 30 MG: 100 INJECTION SUBCUTANEOUS at 08:10

## 2022-06-05 RX ADMIN — BACITRACIN: 500 OINTMENT TOPICAL at 08:14

## 2022-06-05 RX ADMIN — CARVEDILOL 25 MG: 25 TABLET, FILM COATED ORAL at 16:41

## 2022-06-05 RX ADMIN — BACITRACIN: 500 OINTMENT TOPICAL at 16:44

## 2022-06-05 RX ADMIN — METHOCARBAMOL TABLETS 500 MG: 500 TABLET, COATED ORAL at 05:52

## 2022-06-05 RX ADMIN — OXYCODONE HYDROCHLORIDE 5 MG: 5 TABLET ORAL at 00:00

## 2022-06-05 RX ADMIN — HYDROCHLOROTHIAZIDE 25 MG: 25 TABLET ORAL at 08:12

## 2022-06-05 RX ADMIN — AMLODIPINE BESYLATE 10 MG: 10 TABLET ORAL at 08:12

## 2022-06-05 RX ADMIN — METHOCARBAMOL TABLETS 500 MG: 500 TABLET, COATED ORAL at 22:34

## 2022-06-05 RX ADMIN — LOSARTAN POTASSIUM 50 MG: 50 TABLET, FILM COATED ORAL at 08:12

## 2022-06-05 ASSESSMENT — PAIN SCALES - GENERAL
PAINLEVEL_OUTOF10: 2
PAINLEVEL_OUTOF10: 7
PAINLEVEL_OUTOF10: 0
PAINLEVEL_OUTOF10: 6
PAINLEVEL_OUTOF10: 6
PAINLEVEL_OUTOF10: 5

## 2022-06-05 ASSESSMENT — PAIN DESCRIPTION - ORIENTATION
ORIENTATION: RIGHT
ORIENTATION: RIGHT

## 2022-06-05 ASSESSMENT — PAIN DESCRIPTION - FREQUENCY: FREQUENCY: INTERMITTENT

## 2022-06-05 ASSESSMENT — PAIN SCALES - WONG BAKER
WONGBAKER_NUMERICALRESPONSE: 0

## 2022-06-05 ASSESSMENT — PAIN DESCRIPTION - LOCATION
LOCATION: HIP;FLANK
LOCATION: HIP

## 2022-06-05 ASSESSMENT — PAIN DESCRIPTION - DESCRIPTORS
DESCRIPTORS: ACHING;DISCOMFORT
DESCRIPTORS: ACHING;BURNING;CRAMPING

## 2022-06-05 ASSESSMENT — PAIN DESCRIPTION - ONSET: ONSET: ON-GOING

## 2022-06-05 ASSESSMENT — PAIN DESCRIPTION - PAIN TYPE: TYPE: ACUTE PAIN

## 2022-06-05 ASSESSMENT — PAIN - FUNCTIONAL ASSESSMENT: PAIN_FUNCTIONAL_ASSESSMENT: ACTIVITIES ARE NOT PREVENTED
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
## 2022-06-05 NOTE — PROGRESS NOTES
Physical Therapy  Facility/Department: [facility] ACUTE REHAB  Rehabilitation Physical Therapy Treatment Note    NAME: Cassie Correa  : 1975 (48 y.o.)  MRN: [id number]  CODE STATUS: Full Code    Date of Service: 22         OBJECTIVE       Functional Mobility  Transfers  Surface: From bed; To chair with arms; Wheelchair  Device: Walker  Sit to [employer] Level: Contact guard assist;Stand by assist  Stand to Sit  Assistance Level: Stand by assist  Stand Pivot  Assistance Level: Stand by assist  Skilled Clinical Factors: w/RW      Environmental Mobility  Ambulation  Surface: Level surface  Device: Rolling walker  Distance: 109ft x 1, 30ftx1, 10ftx2  Activity: Within Unit  Activity Comments: Pt ed to keep RLE flat on ground when stepping with his LLE to prevent contractures and to improve gait sequencing. Does well with maintaining TTWBing on RLE  Additional Factors: Verbal cues; Increased time to complete  Assistance Level: Stand by assist  Gait Deviations: Slow carlota;Decreased step length bilateral  Stairs  Stair Height: 4''  Device: Bilateral handrails  Number of Stairs: 6  Additional Factors: Verbal cues; Non-reciprocal going up;Non-reciprocal going down; Increased time to complete  Assistance Level: Contact guard assist  Skilled Clinical Factors - Comments: up fwd and down retro with step to sequencing. Demonstrated improved ability to complete task today. PT Exercises  PROM Exercises: Supine RLE ex x 10 (AAROM with SLR's and hip abd)  A/AROM Exercises: standing open chain RLE ex x 10  Resistive Exercises: seated B LE exercises x20 reps with #1 on R LE and #2 on L LE and green tband   Static Standing Balance Exercises: balloon bat x 40sec (pt had to sit due to low BP issues-dizziness)  Exercise Equipment: NU-step L3 x 10mins: (B) UE's , LLE-RLE just along for the ride/ROM      ASSESSMENT/PROGRESS TOWARDS GOALS    Comment: Pt experiencing orthostatic BP's during am session.  Reported feeling dizzy/light headed and requested to sit. BP sitting was 107/69. Standing 76/46 upon immediatly sitting re-took and read 99/65    Assessment  Activity Tolerance: Patient tolerated treatment well;Treatment limited secondary to medical complications (low BP)  PT Equipment Recommendations  Equipment Needed: Yes  Mobility Devices: Walker    Goals  Patient Goals   Patient goals : to get home and be as independent as able  Short Term Goals  Time Frame for Short term goals: 5 days  Short term goal 1: perform bed mobility SBA from flat surface  Short term goal 2: transfer STS SBA c Least restrictive assistive device from varied surface heights. Short term goal 3: Pt to amb  75'-100' on level surfaces, CGA with device. Short term goal 4: Pt to tolerate standing up to 5 minutes 1-2 UE support, CGA for safety. Short term goal 5: Pt to improve posture and sitting balance to GOOD (Static/dynamic) to progress to higher level tasks. Long Term Goals  Time Frame for Long term goals : 10-14 days  Long term goal 1: Perform bed mobility c Mod Indep from flat surface  Long term goal 2: transfer STS Mod indep c Least restrictive assistive device from varied surfaces and heights to simulate home environment. Long term goal 3: Pt to ambulate 150ft on level and uneven surfaces with device to be able to attend [employer]. Long term goal 4: Pt to tolerate standing up to 8 minutes with Good Balance, Mod indep  Long term goal 5: pt ambulate 3 stairs with use of R railing c CGA   Long term goal 6: Pt to propel w/c on varied surfaces, MOD I up to 250'. Long term goal 7: Pt to perform 2MWT with device 75' on level surface with device, MOD i. Long term goal 8: Pt to improve BLE strength by 1 MMG. Long term goal 9: Pt to improve standing balance (Static/dynamic) to GOOD- to reduce risk for falls. Long term goal 10: Pt to complete family training and demo good technique for HEP.     PLAN OF CARE/SAFETY  Plan  Plan:  minutes of therapy at least 5 out of 7 days a week  Specific Instructions for Next Treatment: progress gait, standing balance, endurance with pain control  Current Treatment Recommendations: Strengthening;Balance training;Functional mobility training;Transfer training; Endurance training;Gait training;Home exercise program;Safety education & training;Patient/Caregiver education & training;Equipment evaluation, education, & procurement;ROM;Wheelchair mobility training;Stair training;Pain management;Positioning    EDUCATION  Education  Education Given To: Patient  Education Provided:  Mobility Training;Transfer Training;Equipment  Education Method: Demonstration;Verbal  Barriers to Learning: None  Education Outcome: Verbalized understanding;Demonstrated understanding        Therapy Time   Individual Concurrent Group Co-treatment   Time In 1188/2598         Time Out 1010/1530         Minutes 69/45                   Rafael Bishop PTA, 06/05/22 at 3:59 PM

## 2022-06-05 NOTE — PLAN OF CARE
Problem: Discharge Planning  Goal: Discharge to home or other facility with appropriate resources  6/5/2022 0222 by Lisa Torres RN  Outcome: Progressing  Flowsheets (Taken 6/4/2022 2056)  Discharge to home or other facility with appropriate resources:   Identify barriers to discharge with patient and caregiver   Arrange for needed discharge resources and transportation as appropriate   Identify discharge learning needs (meds, wound care, etc)  6/4/2022 1813 by Wilda Regalado LPN  Outcome: Progressing     Problem: Safety - Adult  Goal: Free from fall injury  6/5/2022 0222 by Lisa Torres RN  Outcome: Progressing  6/4/2022 1813 by Wilda Regalado LPN  Outcome: Progressing     Problem: Skin/Tissue Integrity  Goal: Absence of new skin breakdown  Description: 1. Monitor for areas of redness and/or skin breakdown  2. Assess vascular access sites hourly  3. Every 4-6 hours minimum:  Change oxygen saturation probe site  4. Every 4-6 hours:  If on nasal continuous positive airway pressure, respiratory therapy assess nares and determine need for appliance change or resting period.   6/5/2022 0222 by Lisa Torres RN  Outcome: Progressing  6/4/2022 1813 by Wilda Regalado LPN  Outcome: Progressing     Problem: ABCDS Injury Assessment  Goal: Absence of physical injury  6/5/2022 0222 by Lisa Torres RN  Outcome: Progressing  6/4/2022 1813 by Wilda Regalado LPN  Outcome: Progressing     Problem: Pain  Goal: Verbalizes/displays adequate comfort level or baseline comfort level  6/5/2022 0222 by Lisa Torres RN  Outcome: Progressing  Flowsheets (Taken 6/4/2022 1900)  Verbalizes/displays adequate comfort level or baseline comfort level:   Encourage patient to monitor pain and request assistance   Assess pain using appropriate pain scale   Administer analgesics based on type and severity of pain and evaluate response   Implement non-pharmacological measures as appropriate and evaluate response   Notify Licensed Independent Practitioner if interventions unsuccessful or patient reports new pain  6/4/2022 1813 by Denys Bullock LPN  Outcome: Progressing

## 2022-06-06 ENCOUNTER — TELEPHONE (OUTPATIENT)
Dept: ORTHOPEDIC SURGERY | Age: 47
End: 2022-06-06

## 2022-06-06 PROCEDURE — 97116 GAIT TRAINING THERAPY: CPT

## 2022-06-06 PROCEDURE — 97530 THERAPEUTIC ACTIVITIES: CPT

## 2022-06-06 PROCEDURE — 97535 SELF CARE MNGMENT TRAINING: CPT

## 2022-06-06 PROCEDURE — 1180000000 HC REHAB R&B

## 2022-06-06 PROCEDURE — 6370000000 HC RX 637 (ALT 250 FOR IP): Performed by: PHYSICAL MEDICINE & REHABILITATION

## 2022-06-06 PROCEDURE — 6360000002 HC RX W HCPCS: Performed by: NURSE PRACTITIONER

## 2022-06-06 PROCEDURE — 97110 THERAPEUTIC EXERCISES: CPT

## 2022-06-06 PROCEDURE — 6370000000 HC RX 637 (ALT 250 FOR IP): Performed by: NURSE PRACTITIONER

## 2022-06-06 PROCEDURE — 99213 OFFICE O/P EST LOW 20 MIN: CPT

## 2022-06-06 PROCEDURE — 99232 SBSQ HOSP IP/OBS MODERATE 35: CPT | Performed by: INTERNAL MEDICINE

## 2022-06-06 PROCEDURE — 99232 SBSQ HOSP IP/OBS MODERATE 35: CPT | Performed by: PHYSICAL MEDICINE & REHABILITATION

## 2022-06-06 RX ORDER — HYDROCHLOROTHIAZIDE 25 MG/1
12.5 TABLET ORAL DAILY
Status: DISCONTINUED | OUTPATIENT
Start: 2022-06-07 | End: 2022-06-09 | Stop reason: HOSPADM

## 2022-06-06 RX ADMIN — METHOCARBAMOL TABLETS 500 MG: 500 TABLET, COATED ORAL at 05:43

## 2022-06-06 RX ADMIN — DOCUSATE SODIUM 100 MG: 100 CAPSULE, LIQUID FILLED ORAL at 07:59

## 2022-06-06 RX ADMIN — ENOXAPARIN SODIUM 30 MG: 100 INJECTION SUBCUTANEOUS at 21:13

## 2022-06-06 RX ADMIN — HYDROCHLOROTHIAZIDE 25 MG: 25 TABLET ORAL at 07:59

## 2022-06-06 RX ADMIN — CARVEDILOL 25 MG: 25 TABLET, FILM COATED ORAL at 18:35

## 2022-06-06 RX ADMIN — BACITRACIN: 500 OINTMENT TOPICAL at 14:17

## 2022-06-06 RX ADMIN — LOSARTAN POTASSIUM 50 MG: 50 TABLET, FILM COATED ORAL at 21:13

## 2022-06-06 RX ADMIN — OXYCODONE HYDROCHLORIDE 5 MG: 5 TABLET ORAL at 21:13

## 2022-06-06 RX ADMIN — BACITRACIN: 500 OINTMENT TOPICAL at 21:14

## 2022-06-06 RX ADMIN — LOSARTAN POTASSIUM 50 MG: 50 TABLET, FILM COATED ORAL at 07:59

## 2022-06-06 RX ADMIN — CARVEDILOL 25 MG: 25 TABLET, FILM COATED ORAL at 07:59

## 2022-06-06 RX ADMIN — ENOXAPARIN SODIUM 30 MG: 100 INJECTION SUBCUTANEOUS at 07:59

## 2022-06-06 RX ADMIN — OXYCODONE HYDROCHLORIDE 5 MG: 5 TABLET ORAL at 05:43

## 2022-06-06 RX ADMIN — BACITRACIN: 500 OINTMENT TOPICAL at 09:35

## 2022-06-06 RX ADMIN — POLYETHYLENE GLYCOL 3350 17 G: 17 POWDER, FOR SOLUTION ORAL at 09:36

## 2022-06-06 RX ADMIN — AMLODIPINE BESYLATE 10 MG: 10 TABLET ORAL at 07:59

## 2022-06-06 ASSESSMENT — PAIN SCALES - GENERAL
PAINLEVEL_OUTOF10: 0
PAINLEVEL_OUTOF10: 8
PAINLEVEL_OUTOF10: 7

## 2022-06-06 ASSESSMENT — PAIN DESCRIPTION - LOCATION: LOCATION: HIP

## 2022-06-06 ASSESSMENT — PAIN DESCRIPTION - DESCRIPTORS: DESCRIPTORS: DISCOMFORT

## 2022-06-06 ASSESSMENT — PAIN DESCRIPTION - ORIENTATION: ORIENTATION: RIGHT

## 2022-06-06 NOTE — PROGRESS NOTES
Occupational Therapy  Facility/Department: [facility] ACUTE REHAB  Rehabilitation Occupational Therapy Daily Treatment Note    Date: 22  Patient Name: Zeke Napoles       Room: 2612/2612-01  MRN: [id number]  Account: [de-identified]   : 1975  (48 y.o.) Gender: male                    Past Medical History:  has a past medical history of Asthma and Hypertension. Past Surgical History:   has a past surgical history that includes Finger replantation; Bony pelvis surgery (Right, 2022); and Pelvic fracture surgery (Right, 2022). Restrictions  Restrictions/Precautions: Weight Bearing; Fall Risk  Implants present? : Metal implants  Other position/activity restrictions: OK to shower per ortho Dr. Terri Colbert  Right Lower Extremity Weight Bearing: Toe Touch Weight Bearing  Required Braces or Orthoses?: No    Subjective  Subjective: \"I'm good\"   Pain Level: 7  Pain Location: Hip  Pain Orientation: Right  Restrictions/Precautions: Weight Bearing; Fall Risk        Pain Assessment  Pain Assessment: 0-10  Pain Level: 7  Pain Location: Hip  Pain Orientation: Right  Pain Descriptors: Discomfort    Objective     Cognition  Overall Cognitive Status: WNL  Orientation  Overall Orientation Status: Within Normal Limits         ADL  Feeding  Assistance Level: Independent  Skilled Clinical Factors: Per pt report    Grooming/Oral Hygiene  Assistance Level: Modified independent  Skilled Clinical Factors: Completed while seated on tub bench    Upper Extremity Bathing  Assistance Level: Modified independent  Skilled Clinical Factors: seated on bench    Lower Extremity Bathing  Equipment Provided: Reachers  Assistance Level: Supervision  Skilled Clinical Factors: seated on bench. weight shifting for washing buttocks. Good use of long handled sponge for B feet. Upper Extremity Dressing  Assistance Level: Set-up  Skilled Clinical Factors: setup retrieving t-shirt from counter.      Lower Extremity Dressing  Equipment Provided: Reachers  Assistance Level: Stand by assist;Increased time to complete  Skilled Clinical Factors: good use of reacher for toya tech donning pants. Putting On/Taking Off Footwear  Equipment Provided: Sock aid;Reachers  Assistance Level: Stand by assist  Skilled Clinical Factors: gooduse of AE for foot level tasks. Toileting  Assistance Level: Supervision  Skilled Clinical Factors: standing at GB to urinate    Toilet Transfers  Skilled Clinical Factors: Per pt report, able to complete full toilet transfer with SBA. Tub/Shower Transfers  Type: Shower  Transfer From: Rolling walker  Transfer To: Tub transfer bench  Additional Factors: Increased time to complete  Assistance Level: Stand by assist  Skilled Clinical Factors: good safety noted for shower transfer. ; PM: Pt able to complete tub transfer with use of transfer bench and SBA. Demos with good safety. Light Housekeeping  Light Housekeeping Level: Wheelchair  Light Housekeeping Level of Assistance: Supervision  Light Housekeeping: Pt loaded clothing/detergent into washer and adjusted settings from w/c level     Functional Mobility  Device: Rolling walker  Activity: To/From bathroom  Assistance Level: Stand by assist  Skilled Clinical Factors: Good safety noted. Sit to Supine  Assistance Level: Supervision  Skilled Clinical Factors: with use of leg  for RLE    Supine to Sit  Assistance Level: Supervision  Skilled Clinical Factors: with use of leg  for RLE    Sit to Stand  Assistance Level: Stand by assist  Skilled Clinical Factors: slow pacing. good safety noted.      Stand to Sit  Assistance Level: Stand by assist  Skilled Clinical Factors: Pt requires increased time 2* pain         Assessment  Assessment  Activity Tolerance: Patient tolerated treatment well  OT Equipment Recommendations  Equipment Needed: Yes  Mobility Devices: ADL Assistive Devices  ADL Assistive Devices: Transfer Tub Bench  Safety Devices  Safety Devices in place: Yes  Type of devices: Left in bed;Call light within reach; Patient at risk for falls    Patient Education  Education  Education Given To: Patient  Education Provided: Plan of Care;Safety;ADL Function;Mobility Training;Energy Conservation; Fall Prevention Strategies  Education Method: Verbal  Barriers to Learning: None  Education Outcome: Verbalized understanding;Demonstrated understanding    Plan  Plan  Times per Week: 5-7  Times per Day: Twice a day  Current Treatment Recommendations: Self-Care / ADL; Home management training;Strengthening;Balance training;Functional mobility training; Endurance training; Wheelchair mobility training;Pain management; Safety education & training;Patient/Caregiver education & training;Equipment evaluation, education, & procurement    Goals  Patient Goals   Patient goals : \"To walk with a walker with the pain level between 2-3\"  Short Term Goals  Time Frame for Short term goals: One week  Short Term Goal 1: Patient will verbalize/demontrate Good understanding of assistive equipment/durable medical equipment/modified techniques for increased IND with self-care. Short Term Goal 2: Patient will perform upper body bathing/dressing with setup. Short Term Goal 3: Patient will perform lower body bathing/dressing and toileting tasks with Minimal assist and Good compliance with TTWB R LE. Short Term Goal 4: Patient will verbalize/demonstrate Good understanding of TTWB R LE with regard to self-care. Short Term Goal 5: Patient will actively participate in 30+ minutes of therapeutic exercise/functional activities to promote increased IND with self-care and mobility. Long Term Goals  Time Frame for Long term goals : By discharge  Long Term Goal 1: Patient will perform BADLs with Modified IND and Good compliance with TTWB R LE.   Long Term Goal 2: Patient will perform functional mobility and transfers during self-care with Modified IND, least restrictive mobility device, and Good compliance with TTWB R LE. Long Term Goal 3: Patient will verbalize/demonstrate Good understanding of Fall Prevention Strategies for increased safety and IND. Long Term Goal 4: Patient will perform simple meal prep/light housekeeping with modified IND.     AM-PAC Score                06/06/22 1434 06/06/22 1435   OT Individual Minutes   Time In 1108 1403   Time Out 2212 9521   Minutes 61 31     Kimberley Rendon, KHURRAM/L

## 2022-06-06 NOTE — TELEPHONE ENCOUNTER
No leave the staples in place and we will remove them when he comes in for his post op appt.  Thanks
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
## 2022-06-06 NOTE — TELEPHONE ENCOUNTER
Chirag (nurse) called from rehab center, patient has a f/u appt scheduled for 6/10/22 and nurse wants to know if wound/incision looks good is is alright for them to take them out before his appt?  Patient had acetabulum ORIF on 5/27/22 Please advise
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
## 2022-06-06 NOTE — PLAN OF CARE
Problem: Discharge Planning  Goal: Discharge to home or other facility with appropriate resources  6/6/2022 0205 by Shravan Wade RN  Outcome: Progressing  Flowsheets (Taken 6/5/2022 2230)  Discharge to home or other facility with appropriate resources:   Identify barriers to discharge with patient and caregiver   Arrange for needed discharge resources and transportation as appropriate   Identify discharge learning needs (meds, wound care, etc)   Refer to discharge planning if patient needs post-hospital services based on physician order or complex needs related to functional status, cognitive ability or social support system  6/5/2022 1726 by Samm Kolb RN  Outcome: Progressing     Problem: Safety - Adult  Goal: Free from fall injury  6/6/2022 0205 by Shravan Wade RN  Outcome: Progressing  6/5/2022 1726 by Samm Kolb RN  Outcome: Progressing     Problem: Skin/Tissue Integrity  Goal: Absence of new skin breakdown  Description: 1. Monitor for areas of redness and/or skin breakdown  2. Assess vascular access sites hourly  3. Every 4-6 hours minimum:  Change oxygen saturation probe site  4. Every 4-6 hours:  If on nasal continuous positive airway pressure, respiratory therapy assess nares and determine need for appliance change or resting period.   6/6/2022 0205 by Shravan Wade RN  Outcome: Progressing  6/5/2022 1726 by Samm Kolb RN  Outcome: Progressing     Problem: ABCDS Injury Assessment  Goal: Absence of physical injury  6/6/2022 0205 by Shravan Wade RN  Outcome: Progressing  6/5/2022 1726 by Samm Kolb RN  Outcome: Progressing     Problem: Pain  Goal: Verbalizes/displays adequate comfort level or baseline comfort level  6/6/2022 0205 by Shravan Wade RN  Outcome: Progressing  Flowsheets (Taken 6/5/2022 2130)  Verbalizes/displays adequate comfort level or baseline comfort level:   Encourage patient to monitor pain and request assistance   Assess pain using

## 2022-06-06 NOTE — PLAN OF CARE
Problem: Discharge Planning  Goal: Discharge to home or other facility with appropriate resources  6/6/2022 1301 by Matthew Doss LPN  Outcome: Progressing  6/6/2022 0205 by Mariana Hong RN  Outcome: Progressing  Flowsheets (Taken 6/5/2022 2230)  Discharge to home or other facility with appropriate resources:   Identify barriers to discharge with patient and caregiver   Arrange for needed discharge resources and transportation as appropriate   Identify discharge learning needs (meds, wound care, etc)   Refer to discharge planning if patient needs post-hospital services based on physician order or complex needs related to functional status, cognitive ability or social support system     Problem: Safety - Adult  Goal: Free from fall injury  6/6/2022 1301 by Matthew Doss LPN  Outcome: Progressing  6/6/2022 0205 by Mariana Hong RN  Outcome: Progressing     Problem: Skin/Tissue Integrity  Goal: Absence of new skin breakdown  Description: 1. Monitor for areas of redness and/or skin breakdown  2. Assess vascular access sites hourly  3. Every 4-6 hours minimum:  Change oxygen saturation probe site  4. Every 4-6 hours:  If on nasal continuous positive airway pressure, respiratory therapy assess nares and determine need for appliance change or resting period.   6/6/2022 1301 by Matthew Doss LPN  Outcome: Progressing  6/6/2022 0205 by Mariana Hong RN  Outcome: Progressing     Problem: ABCDS Injury Assessment  Goal: Absence of physical injury  6/6/2022 1301 by Matthew Doss LPN  Outcome: Progressing  6/6/2022 0205 by Mariana Hong RN  Outcome: Progressing     Problem: Pain  Goal: Verbalizes/displays adequate comfort level or baseline comfort level  6/6/2022 1301 by Matthew Doss LPN  Outcome: Progressing  6/6/2022 0205 by Mariana Hong RN  Outcome: Progressing  Flowsheets (Taken 6/5/2022 2130)  Verbalizes/displays adequate comfort level or baseline comfort level:   Encourage patient to monitor pain and request assistance   Assess pain using appropriate pain scale   Administer analgesics based on type and severity of pain and evaluate response   Implement non-pharmacological measures as appropriate and evaluate response   Notify Licensed Independent Practitioner if interventions unsuccessful or patient reports new pain     Problem: Nutrition Deficit:  Goal: Optimize nutritional status  Outcome: Progressing

## 2022-06-06 NOTE — PROGRESS NOTES
Verbal cues; Non-reciprocal going up;Non-reciprocal going down; Increased time to complete  Assistance Level: Contact guard assist  Skilled Clinical Factors - Comments: patient completed 3 steps with B handrail and ascended FWD and descended BWD. Patient then instructed in 1 HR (R) and LBQC d/t patient only having 1 HR to enter into his home. Patient completed 3 steps with this technique and completed going FWD both ways              PT Exercises  Exercise Treatment: supine B LEs stretches 3x 30 seconds   Resistive Exercises: seated B LE exercises x20 reps with #1.5 on R LE and #2.5 on L LE and green tband   Circulation/Endurance Exercises: seated UBE 5 minutes FWD/BWD. NuStep L2 x12 minutes while keeping TTWB status       ASSESSMENT/PROGRESS TOWARDS GOALS       Assessment  Activity Tolerance: Patient tolerated treatment well  Discharge Recommendations: Home with assist PRN;Patient would benefit from continued therapy after discharge  PT Equipment Recommendations  Equipment Needed: Yes  Mobility Devices: Walker    Goals  Patient Goals   Patient goals : to get home and be as independent as able  Short Term Goals  Time Frame for Short term goals: 5 days  Short term goal 1: perform bed mobility SBA from flat surface  Short term goal 2: transfer STS SBA c Least restrictive assistive device from varied surface heights. Short term goal 3: Pt to amb  75'-100' on level surfaces, CGA with device. Short term goal 4: Pt to tolerate standing up to 5 minutes 1-2 UE support, CGA for safety. Short term goal 5: Pt to improve posture and sitting balance to GOOD (Static/dynamic) to progress to higher level tasks. Long Term Goals  Time Frame for Long term goals : 10-14 days  Long term goal 1: Perform bed mobility c Mod Indep from flat surface  Long term goal 2: transfer STS Mod indep c Least restrictive assistive device from varied surfaces and heights to simulate home environment.   Long term goal 3: Pt to ambulate 150ft on level and uneven surfaces with device to be able to attend [employer]. Long term goal 4: Pt to tolerate standing up to 8 minutes with Good Balance, Mod indep  Long term goal 5: pt ambulate 3 stairs with use of R railing c CGA   Long term goal 6: Pt to propel w/c on varied surfaces, MOD I up to 250'. Long term goal 7: Pt to perform 2MWT with device 75' on level surface with device, MOD i. Long term goal 8: Pt to improve BLE strength by 1 MMG. Long term goal 9: Pt to improve standing balance (Static/dynamic) to GOOD- to reduce risk for falls. Long term goal 10: Pt to complete family training and demo good technique for HEP. PLAN OF CARE/SAFETY  Plan  Plan:  minutes of therapy at least 5 out of 7 days a week  Specific Instructions for Next Treatment: progress gait, standing balance, endurance with pain control  Current Treatment Recommendations: Strengthening;Balance training;Functional mobility training;Transfer training; Endurance training;Gait training;Home exercise program;Safety education & training;Patient/Caregiver education & training;Equipment evaluation, education, & procurement;ROM;Wheelchair mobility training;Stair training;Pain management;Positioning  Safety Devices  Type of Devices: Call light within reach;Nurse notified;Gait belt; All fall risk precautions in place; Left in bed  Restraints  Restraints Initially in Place: No    EDUCATION  Education  Education Given To: Patient  Education Provided: Home Exercise Program;Mobility Training;Equipment  Education Method: Demonstration;Verbal  Barriers to Learning: None  Education Outcome: Verbalized understanding;Demonstrated understanding           06/06/22 1002 06/06/22 1537   PT Individual Minutes   Time In 5275 0311   Time Out 8355 3127   [facility] 29 04   PT Concurrent Minutes   Time In 0902  --    Time Out 1582  --    Minutes 9  --      Michial Phalen, PTA, 06/06/22 at 3:43 PM
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
## 2022-06-07 PROCEDURE — 6370000000 HC RX 637 (ALT 250 FOR IP): Performed by: PHYSICAL MEDICINE & REHABILITATION

## 2022-06-07 PROCEDURE — 99231 SBSQ HOSP IP/OBS SF/LOW 25: CPT | Performed by: INTERNAL MEDICINE

## 2022-06-07 PROCEDURE — 6360000002 HC RX W HCPCS: Performed by: NURSE PRACTITIONER

## 2022-06-07 PROCEDURE — 6370000000 HC RX 637 (ALT 250 FOR IP): Performed by: NURSE PRACTITIONER

## 2022-06-07 PROCEDURE — 97110 THERAPEUTIC EXERCISES: CPT

## 2022-06-07 PROCEDURE — 97530 THERAPEUTIC ACTIVITIES: CPT

## 2022-06-07 PROCEDURE — 99232 SBSQ HOSP IP/OBS MODERATE 35: CPT | Performed by: PHYSICAL MEDICINE & REHABILITATION

## 2022-06-07 PROCEDURE — 1180000000 HC REHAB R&B

## 2022-06-07 PROCEDURE — 97542 WHEELCHAIR MNGMENT TRAINING: CPT

## 2022-06-07 PROCEDURE — 97116 GAIT TRAINING THERAPY: CPT

## 2022-06-07 RX ADMIN — HYDROCHLOROTHIAZIDE 12.5 MG: 25 TABLET ORAL at 08:53

## 2022-06-07 RX ADMIN — AMLODIPINE BESYLATE 10 MG: 10 TABLET ORAL at 08:53

## 2022-06-07 RX ADMIN — LOSARTAN POTASSIUM 50 MG: 50 TABLET, FILM COATED ORAL at 20:14

## 2022-06-07 RX ADMIN — BACITRACIN: 500 OINTMENT TOPICAL at 20:17

## 2022-06-07 RX ADMIN — LOSARTAN POTASSIUM 50 MG: 50 TABLET, FILM COATED ORAL at 08:52

## 2022-06-07 RX ADMIN — CARVEDILOL 25 MG: 25 TABLET, FILM COATED ORAL at 17:03

## 2022-06-07 RX ADMIN — CARVEDILOL 25 MG: 25 TABLET, FILM COATED ORAL at 08:53

## 2022-06-07 RX ADMIN — ENOXAPARIN SODIUM 30 MG: 100 INJECTION SUBCUTANEOUS at 08:52

## 2022-06-07 RX ADMIN — POLYETHYLENE GLYCOL 3350 17 G: 17 POWDER, FOR SOLUTION ORAL at 08:52

## 2022-06-07 RX ADMIN — BACITRACIN: 500 OINTMENT TOPICAL at 13:17

## 2022-06-07 RX ADMIN — BACITRACIN: 500 OINTMENT TOPICAL at 09:00

## 2022-06-07 RX ADMIN — METHOCARBAMOL TABLETS 500 MG: 500 TABLET, COATED ORAL at 07:35

## 2022-06-07 RX ADMIN — METHOCARBAMOL TABLETS 500 MG: 500 TABLET, COATED ORAL at 20:20

## 2022-06-07 RX ADMIN — OXYCODONE HYDROCHLORIDE 5 MG: 5 TABLET ORAL at 07:35

## 2022-06-07 ASSESSMENT — PAIN DESCRIPTION - LOCATION
LOCATION: BUTTOCKS;HIP
LOCATION: HIP

## 2022-06-07 ASSESSMENT — PAIN DESCRIPTION - ORIENTATION
ORIENTATION: RIGHT
ORIENTATION: RIGHT

## 2022-06-07 ASSESSMENT — PAIN SCALES - GENERAL
PAINLEVEL_OUTOF10: 7
PAINLEVEL_OUTOF10: 8
PAINLEVEL_OUTOF10: 3
PAINLEVEL_OUTOF10: 3

## 2022-06-07 NOTE — PLAN OF CARE
Problem: Discharge Planning  Goal: Discharge to home or other facility with appropriate resources  Outcome: Progressing  Flowsheets (Taken 6/7/2022 0725)  Discharge to home or other facility with appropriate resources:   Identify barriers to discharge with patient and caregiver   Arrange for needed discharge resources and transportation as appropriate   Identify discharge learning needs (meds, wound care, etc)   Refer to discharge planning if patient needs post-hospital services based on physician order or complex needs related to functional status, cognitive ability or social support system     Problem: Safety - Adult  Goal: Free from fall injury  Outcome: Progressing  Flowsheets (Taken 6/7/2022 1014)  Free From Fall Injury:   Instruct family/caregiver on patient safety   Based on caregiver fall risk screen, instruct family/caregiver to ask for assistance with transferring infant if caregiver noted to have fall risk factors     Problem: ABCDS Injury Assessment  Goal: Absence of physical injury  Recent Flowsheet Documentation  Taken 6/7/2022 1014 by Dasha Spencer RN  Absence of Physical Injury: Implement safety measures based on patient assessment     Problem: Pain  Goal: Verbalizes/displays adequate comfort level or baseline comfort level  Outcome: Progressing
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
## 2022-06-07 NOTE — FLOWSHEET NOTE
06/07/22 1140   Encounter Summary   Encounter Overview/Reason  Attempted Encounter   Service Provided For: Patient not available  (Out of room)   Visited by Kanika Boucher

## 2022-06-07 NOTE — PROGRESS NOTES
Occupational Therapy  [facility]  Rehabilitation Occupational Therapy Daily Treatment Note    Date: 22  Patient Name: Anthony Villeda       Room: 2612/2612-01  MRN: [id number]  Account: [de-identified]   : 1975  (48 y.o.) Gender: male                    Past Medical History:  has a past medical history of Asthma and Hypertension. Past Surgical History:   has a past surgical history that includes Finger replantation; Bony pelvis surgery (Right, 2022); and Pelvic fracture surgery (Right, 2022). Restrictions  Restrictions/Precautions: Weight Bearing; Fall Risk  Implants present? : Metal implants  Other position/activity restrictions: OK to shower per ortho Dr. Berna Sterling  Right Lower Extremity Weight Bearing: Toe Touch Weight Bearing  Required Braces or Orthoses?: No    Subjective  Subjective: \"I'm ready to go home\"  Pain Level: 3  Pain Location: Buttocks; Hip  Pain Orientation: Right  Restrictions/Precautions: Weight Bearing; Fall Risk        Pain Assessment  Pain Assessment: 0-10  Pain Level: 3  Pain Location: Buttocks,Hip  Pain Orientation: Right  Pain Descriptors: Discomfort    Objective     Cognition  Overall Cognitive Status: WNL  Orientation  Overall Orientation Status: Within Normal Limits         ADL  Toileting  Assistance Level: Modified independent  Skilled Clinical Factors: standing at GB to urinate    Toilet Transfers  Technique:  (standing from w/c)  Equipment: Grab bars  Assistance Level: Modified independent  Skilled Clinical Factors: Pt able to stand at toilet and urinate with good safety noted. Light Housekeeping  Light Housekeeping Level: Wheelchair  Light Housekeeping Level of Assistance: Supervision  Light Housekeeping: Pt able to retrieve clothes from dryer with good use of reacher.      Commmunity Re-entry  Community Re-Entry Level: Wheelchair  Community Re-entry Level of Assistance: Supervision  Community Re-Entry: Pt instruction in w/c mobility throughout hospital and small areas in gift shop for facilitation of increased I with AIDLs. Pt completes with increased time for manuvering in small spaces. Tolerates well with intermittent RB. Functional Mobility  Device: Rolling walker  Activity:  (few feet to w/c. )  Assistance Level: Stand by assist  Skilled Clinical Factors: Good safety noted. Sit to Supine  Assistance Level: Modified independent  Skilled Clinical Factors: with use of leg  for RLE    Supine to Sit  Assistance Level: Modified independent  Skilled Clinical Factors: with use of leg  for RLE    Sit to Stand  Assistance Level: Supervision  Skilled Clinical Factors: slow pacing. good safety noted. Stand to Sit  Assistance Level: Supervision  Skilled Clinical Factors: Pt requires increased time 2* discomfort     OT Exercises  Resistive Exercises: Pt instruction in BUE exercises for facilitaiton of increased strength and endurance for safety with functional mobility/transfers. Pt completes in all available planes using 3# free kusum for 20 reps X2 sets. Tolerates well. Dynamic Standing Balance Exercises: PM: Pt instruction in standing tasks at tabletop and engaging in bean bag toss for facilitation of increased tolerance and safety with functional standing tasks. Pt tolerates standing X 3 trials of 1:20-3:00 minutes with seated RB req due to increased pain/discomfort in R hip. Assessment  Assessment  Activity Tolerance: Patient tolerated treatment well  OT Equipment Recommendations  Equipment Needed: Yes  Mobility Devices: ADL Assistive Devices  ADL Assistive Devices: Transfer Tub Bench  Safety Devices  Safety Devices in place: Yes  Type of devices: Left in bed;Call light within reach; Patient at risk for falls    Patient Education  Education  Education Given To: Patient  Education Provided: Plan of Care;Precautions; Safety; Mobility Training;Energy Conservation  Education Method: Verbal  Barriers to Learning: None  Education Outcome: Verbalized understanding;Demonstrated understanding    Plan  Plan  Times per Week: 5-7  Times per Day: Twice a day  Current Treatment Recommendations: Self-Care / ADL; Home management training;Strengthening;Balance training;Functional mobility training; Endurance training; Wheelchair mobility training;Pain management; Safety education & training;Patient/Caregiver education & training;Equipment evaluation, education, & procurement    Goals  Patient Goals   Patient goals : \"To walk with a walker with the pain level between 2-3\"  Short Term Goals  Time Frame for Short term goals: One week  Short Term Goal 1: Patient will verbalize/demontrate Good understanding of assistive equipment/durable medical equipment/modified techniques for increased IND with self-care. Short Term Goal 2: Patient will perform upper body bathing/dressing with setup. Short Term Goal 3: Patient will perform lower body bathing/dressing and toileting tasks with Minimal assist and Good compliance with TTWB R LE. Short Term Goal 4: Patient will verbalize/demonstrate Good understanding of TTWB R LE with regard to self-care. Short Term Goal 5: Patient will actively participate in 30+ minutes of therapeutic exercise/functional activities to promote increased IND with self-care and mobility. Long Term Goals  Time Frame for Long term goals : By discharge  Long Term Goal 1: Patient will perform BADLs with Modified IND and Good compliance with TTWB R LE. Long Term Goal 2: Patient will perform functional mobility and transfers during self-care with Modified IND, least restrictive mobility device, and Good compliance with TTWB R LE. Long Term Goal 3: Patient will verbalize/demonstrate Good understanding of Fall Prevention Strategies for increased safety and IND. Long Term Goal 4: Patient will perform simple meal prep/light housekeeping with modified IND.     AM-PAC Score                         06/07/22 1554 06/07/22 1555   OT Individual Minutes   Time In [id number]   Time Out [id number]   Minutes 64 [facility] KHURRAM Valle/L

## 2022-06-07 NOTE — PROGRESS NOTES
Junella Bosworth was evaluated today and a DME order was entered for a wheeled walker because he requires this to successfully complete daily living tasks of personal cares, ambulating and meal preparation. A wheeled walker is necessary due to the patient's unsteady gait, upper body weakness, and inability to  an ambulation device; and he can ambulate only by pushing a walker instead of a lesser assistive device such as a cane, crutch, or standard walker. The need for this equipment was discussed with the patient and he understands and is in agreement.

## 2022-06-07 NOTE — CARE COORDINATION
06/07/22 1315  DME Order for [facility] as OP  ONE TIME     Complete    Comments: You must complete the order pa. ..    06/07/22 1306   06/07/22 0745  DME Order for Bath/Shower Seat at OP  ONE TIME     Completed    Comments:  Tub transfer bench with back . ..    06/07/22 0747   06/07/22 0745  DME Order for [facility]) as OP  ONE TIME     Completed    Comments:  - DME device ordered - bernardo nolan. .. The above DME was faxed to [facility]. Fax confirmation received.

## 2022-06-07 NOTE — PROGRESS NOTES
Physical Therapy  Facility/Department: [facility] ACUTE REHAB  Rehabilitation Physical Therapy Treatment Note    NAME: Cassie Crorea  : 1975 (48 y.o.)  MRN: [id number]  CODE STATUS: Full Code    Date of Service: 22       Restrictions:  Restrictions/Precautions: Weight Bearing; Fall Risk  Lower Extremity Weight Bearing Restrictions  Right Lower Extremity Weight Bearing: Toe Touch Weight Bearing     SUBJECTIVE  Subjective  Subjective: Patient stated he slept well throughout the night   Pain Assessment  Pain Assessment: 0-10  Pain Level: 3  Pain Location: Hip  Pain Orientation: Right        Post Treatment Pain Screening  Pain Assessment  Pain Assessment: 0-10  Pain Level: 3  Pain Location: Hip  Pain Orientation: Right      OBJECTIVE  Cognition  Overall Cognitive Status: WNL  Orientation  Overall Orientation Status: Within Normal Limits    Functional Mobility  Bed Mobility  Overall Assistance Level: Stand By Assist  Roll Left  Assistance Level: Stand by assist  Supine to Sit  Assistance Level: Stand by assist  Scooting  Assistance Level: Modified independent  Balance  Sitting Balance: Modified independent   Standing Balance: Supervision  Transfers  Surface: From bed; To chair with arms; Wheelchair  Additional Factors: Mat raised  Device: Walker  Sit to [employer] Level: Stand by assist  Stand to Sit  Assistance Level: Stand by assist      Environmental Mobility  Ambulation  Surface: Level surface; Uneven surface; Ramp  Device: Rolling walker  Distance: 86ft, 40ft AM (80ft, 50ft PM)  Activity: Within Unit; Other (Comment) (outside )  Activity Comments: Pt ed to keep RLE flat on ground when stepping with his LLE to prevent contractures and to improve gait sequencing. Does well with maintaining TTWBing on RLE  Additional Factors: Verbal cues; Increased time to complete  Assistance Level: Stand by assist  Gait Deviations: Slow carlota;Decreased step length bilateral  Stairs  Stair Height: 4''  Device: One handrail;Quad Cane  Number of Stairs: 6  Additional Factors: Verbal cues; Non-reciprocal going up;Non-reciprocal going down; Increased time to complete  Assistance Level: Contact guard assist  Wheelchair  Surface: Level surface; Uneven surface; Ramp  Device: Standard wheelchair  Additional Factors: Verbal cues  Assistance Required to [employer]: No assistance  Assistance Level for Propulsion: Supervision  Propulsion Method: Bilateral upper extremities  Propulsion Distance: 150ft             PT Exercises  Exercise Treatment: supine B LEs stretches 3x 30 seconds   Resistive Exercises: seated B LE exercises x20 reps with #1.5 on R LE and #2.5 on L LE and purple tband   Dynamic Standing Balance Exercises: standing dyanmic balance with no UE support, throwing ball back and forth       ASSESSMENT/PROGRESS TOWARDS GOALS       Assessment  Activity Tolerance: Patient tolerated treatment well  Discharge Recommendations: Home with assist PRN;Patient would benefit from continued therapy after discharge  PT Equipment Recommendations  Equipment Needed: Yes  Mobility Devices: Briseyda Thompson: Rolling    Goals  Patient Goals   Patient goals : to get home and be as independent as able  Short Term Goals  Time Frame for Short term goals: 5 days  Short term goal 1: perform bed mobility SBA from flat surface  Short term goal 2: transfer STS SBA c Least restrictive assistive device from varied surface heights. Short term goal 3: Pt to amb  75'-100' on level surfaces, CGA with device. Short term goal 4: Pt to tolerate standing up to 5 minutes 1-2 UE support, CGA for safety. Short term goal 5: Pt to improve posture and sitting balance to GOOD (Static/dynamic) to progress to higher level tasks.    Long Term Goals  Time Frame for Long term goals : 10-14 days  Long term goal 1: Perform bed mobility c Mod Indep from flat surface  Long term goal 2: transfer STS Mod indep c Least restrictive assistive device from varied surfaces and heights to simulate home environment. Long term goal 3: Pt to ambulate 150ft on level and uneven surfaces with device to be able to attend Faith. Long term goal 4: Pt to tolerate standing up to 8 minutes with Good Balance, Mod indep  Long term goal 5: pt ambulate 3 stairs with use of R railing c CGA   Long term goal 6: Pt to propel w/c on varied surfaces, MOD I up to 250'. Long term goal 7: Pt to perform 2MWT with device 75' on level surface with device, MOD i. Long term goal 8: Pt to improve BLE strength by 1 MMG. Long term goal 9: Pt to improve standing balance (Static/dynamic) to GOOD- to reduce risk for falls. Long term goal 10: Pt to complete family training and demo good technique for HEP. PLAN OF CARE/SAFETY  Plan  Plan:  minutes of therapy at least 5 out of 7 days a week  Specific Instructions for Next Treatment: progress gait, standing balance, endurance with pain control  Current Treatment Recommendations: Strengthening;Balance training;Functional mobility training;Transfer training; Endurance training;Gait training;Home exercise program;Safety education & training;Patient/Caregiver education & training;Equipment evaluation, education, & procurement;ROM;Wheelchair mobility training;Stair training;Pain management;Positioning  Safety Devices  Type of Devices: Call light within reach;Nurse notified;Gait belt; All fall risk precautions in place; Left in bed  Restraints  Restraints Initially in Place: No       06/07/22 1117 06/07/22 1533   PT Individual Minutes   Time In 1624 0205   Time Out 1007 1524   Minutes 60 375 [facility], 06/07/22 at 3:37 PM
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
## 2022-06-08 LAB
ALT SERPL-CCNC: 35 U/L (ref 5–41)
AST SERPL-CCNC: 24 U/L

## 2022-06-08 PROCEDURE — 6370000000 HC RX 637 (ALT 250 FOR IP): Performed by: PHYSICAL MEDICINE & REHABILITATION

## 2022-06-08 PROCEDURE — 97542 WHEELCHAIR MNGMENT TRAINING: CPT

## 2022-06-08 PROCEDURE — 97110 THERAPEUTIC EXERCISES: CPT

## 2022-06-08 PROCEDURE — 6370000000 HC RX 637 (ALT 250 FOR IP): Performed by: NURSE PRACTITIONER

## 2022-06-08 PROCEDURE — 36415 COLL VENOUS BLD VENIPUNCTURE: CPT

## 2022-06-08 PROCEDURE — 1180000000 HC REHAB R&B

## 2022-06-08 PROCEDURE — 6360000002 HC RX W HCPCS: Performed by: NURSE PRACTITIONER

## 2022-06-08 PROCEDURE — 97116 GAIT TRAINING THERAPY: CPT

## 2022-06-08 PROCEDURE — 97530 THERAPEUTIC ACTIVITIES: CPT

## 2022-06-08 PROCEDURE — 84460 ALANINE AMINO (ALT) (SGPT): CPT

## 2022-06-08 PROCEDURE — 99232 SBSQ HOSP IP/OBS MODERATE 35: CPT | Performed by: PHYSICAL MEDICINE & REHABILITATION

## 2022-06-08 PROCEDURE — 97535 SELF CARE MNGMENT TRAINING: CPT

## 2022-06-08 PROCEDURE — 99231 SBSQ HOSP IP/OBS SF/LOW 25: CPT | Performed by: INTERNAL MEDICINE

## 2022-06-08 PROCEDURE — 84450 TRANSFERASE (AST) (SGOT): CPT

## 2022-06-08 RX ORDER — CARVEDILOL 25 MG/1
25 TABLET ORAL 2 TIMES DAILY WITH MEALS
Qty: 60 TABLET | Refills: 3 | Status: SHIPPED | OUTPATIENT
Start: 2022-06-08

## 2022-06-08 RX ORDER — ENOXAPARIN SODIUM 100 MG/ML
30 INJECTION SUBCUTANEOUS 2 TIMES DAILY
Qty: 17.4 ML | Refills: 0 | Status: SHIPPED | OUTPATIENT
Start: 2022-06-08 | End: 2022-07-07

## 2022-06-08 RX ORDER — AMLODIPINE BESYLATE 10 MG/1
10 TABLET ORAL DAILY
Qty: 30 TABLET | Refills: 3 | Status: SHIPPED | OUTPATIENT
Start: 2022-06-08

## 2022-06-08 RX ORDER — OXYCODONE HYDROCHLORIDE 5 MG/1
5 TABLET ORAL EVERY 6 HOURS PRN
Qty: 28 TABLET | Refills: 0 | Status: SHIPPED | OUTPATIENT
Start: 2022-06-08 | End: 2022-06-15

## 2022-06-08 RX ORDER — LIDOCAINE 4 G/G
1 PATCH TOPICAL DAILY
Qty: 14 PATCH | Refills: 0 | Status: SHIPPED | OUTPATIENT
Start: 2022-06-09

## 2022-06-08 RX ORDER — HYDROCHLOROTHIAZIDE 12.5 MG/1
12.5 TABLET ORAL DAILY
Qty: 30 TABLET | Refills: 3 | Status: SHIPPED | OUTPATIENT
Start: 2022-06-09

## 2022-06-08 RX ORDER — METHOCARBAMOL 500 MG/1
500 TABLET, FILM COATED ORAL 3 TIMES DAILY PRN
Qty: 30 TABLET | Refills: 0 | Status: SHIPPED | OUTPATIENT
Start: 2022-06-08 | End: 2022-06-18

## 2022-06-08 RX ORDER — GINSENG 100 MG
CAPSULE ORAL
Qty: 1 EACH | Refills: 3 | Status: SHIPPED | OUTPATIENT
Start: 2022-06-08 | End: 2022-06-18

## 2022-06-08 RX ADMIN — METHOCARBAMOL TABLETS 500 MG: 500 TABLET, COATED ORAL at 17:49

## 2022-06-08 RX ADMIN — BACITRACIN: 500 OINTMENT TOPICAL at 20:52

## 2022-06-08 RX ADMIN — BACITRACIN: 500 OINTMENT TOPICAL at 08:10

## 2022-06-08 RX ADMIN — AMLODIPINE BESYLATE 10 MG: 10 TABLET ORAL at 08:04

## 2022-06-08 RX ADMIN — OXYCODONE HYDROCHLORIDE 5 MG: 5 TABLET ORAL at 02:35

## 2022-06-08 RX ADMIN — OXYCODONE HYDROCHLORIDE 5 MG: 5 TABLET ORAL at 08:46

## 2022-06-08 RX ADMIN — METHOCARBAMOL TABLETS 500 MG: 500 TABLET, COATED ORAL at 06:08

## 2022-06-08 RX ADMIN — CARVEDILOL 25 MG: 25 TABLET, FILM COATED ORAL at 08:04

## 2022-06-08 RX ADMIN — LOSARTAN POTASSIUM 50 MG: 50 TABLET, FILM COATED ORAL at 20:52

## 2022-06-08 RX ADMIN — DOCUSATE SODIUM 100 MG: 100 CAPSULE, LIQUID FILLED ORAL at 08:04

## 2022-06-08 RX ADMIN — CARVEDILOL 25 MG: 25 TABLET, FILM COATED ORAL at 16:55

## 2022-06-08 RX ADMIN — ENOXAPARIN SODIUM 30 MG: 100 INJECTION SUBCUTANEOUS at 20:53

## 2022-06-08 RX ADMIN — HYDROCHLOROTHIAZIDE 12.5 MG: 25 TABLET ORAL at 08:04

## 2022-06-08 RX ADMIN — LOSARTAN POTASSIUM 50 MG: 50 TABLET, FILM COATED ORAL at 08:04

## 2022-06-08 RX ADMIN — ENOXAPARIN SODIUM 30 MG: 100 INJECTION SUBCUTANEOUS at 08:04

## 2022-06-08 RX ADMIN — BACITRACIN: 500 OINTMENT TOPICAL at 14:00

## 2022-06-08 ASSESSMENT — PAIN SCALES - GENERAL
PAINLEVEL_OUTOF10: 6
PAINLEVEL_OUTOF10: 7
PAINLEVEL_OUTOF10: 7
PAINLEVEL_OUTOF10: 6

## 2022-06-08 ASSESSMENT — PAIN DESCRIPTION - ORIENTATION
ORIENTATION: RIGHT

## 2022-06-08 ASSESSMENT — PAIN DESCRIPTION - ONSET: ONSET: ON-GOING

## 2022-06-08 ASSESSMENT — PAIN DESCRIPTION - LOCATION
LOCATION: HIP
LOCATION: HIP
LOCATION: LEG
LOCATION: HIP
LOCATION: HIP

## 2022-06-08 ASSESSMENT — PAIN DESCRIPTION - PAIN TYPE: TYPE: ACUTE PAIN

## 2022-06-08 ASSESSMENT — PAIN DESCRIPTION - DESCRIPTORS
DESCRIPTORS: DISCOMFORT
DESCRIPTORS: DISCOMFORT

## 2022-06-08 ASSESSMENT — PAIN - FUNCTIONAL ASSESSMENT: PAIN_FUNCTIONAL_ASSESSMENT: ACTIVITIES ARE NOT PREVENTED
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
## 2022-06-08 NOTE — PROGRESS NOTES
Physical Therapy  Facility/Department: [facility] ACUTE REHAB  Rehabilitation Physical Therapy Treatment Note    NAME: Marybeth Han  : 1975 (48 y.o.)  MRN: [id number]  CODE STATUS: Full Code    Date of Service: 22       Restrictions:  Restrictions/Precautions: Weight Bearing; Fall Risk  Lower Extremity Weight Bearing Restrictions  Right Lower Extremity Weight Bearing: Toe Touch Weight Bearing     SUBJECTIVE  Subjective  Subjective: Patient stated he slept well throughout the night   Pain Assessment  Pain Assessment: 0-10  Pain Level: 6  Pain Location: Hip  Pain Orientation: Right        Post Treatment Pain Screening  Pain Assessment  Pain Assessment: 0-10  Pain Level: 6  Pain Location: Hip  Pain Orientation: Right      OBJECTIVE  Cognition  Overall Cognitive Status: WNL  Patient affect[de-identified] Normal  Orientation  Overall Orientation Status: Within Normal Limits    Functional Mobility  Bed Mobility  Overall Assistance Level: Modified Independent  Roll Left  Assistance Level: Modified independent  Roll Right  Assistance Level: Modified independent  Supine to Sit  Assistance Level: Modified independent  Scooting  Assistance Level: Modified independent  Balance  Sitting Balance: Modified independent   Standing Balance: Modified independent   Transfers  Surface: From bed; To chair with arms; Wheelchair  Additional Factors: Mat raised  Device: Walker  Sit to [employer] Level: Modified independent  Stand to [employer] Level: Modified independent  Stand Pivot  Assistance Level: Modified independent  Skilled Clinical Factors: w/RW      Environmental Mobility  Ambulation  Surface: Level surface  Device: Rolling walker  Distance: 170ft (137ft, 2MWT- 174ft total)  Activity: Within Room  Additional Factors: Verbal cues; Increased time to complete  Assistance Level: Modified independent  Gait Deviations: Slow carlota;Decreased step length bilateral  Skilled Clinical Factors: WC follow  Stairs  Stair Height: 4''  Device: One handrail;Quad Cane  Number of Stairs: 6  Additional Factors: Verbal cues; Non-reciprocal going up;Non-reciprocal going down; Increased time to complete  Assistance Level: Supervision  Skilled Clinical Factors - Comments: patient completed 3 steps with R handrail and completed laterally. Patient then instructed in 1 HR (R) and LBQC d/t patient only having 1 HR to enter into his home. Patient completed 3 steps with this technique and completed going FWD both ways   Wheelchair  Surface: Level surface  Device: Standard wheelchair  Assistance Required to [employer]: No assistance  Assistance Level for Propulsion: Modified independent  Propulsion Method: Bilateral upper extremities  Propulsion Distance: 300ft AM (180ft PM)             PT Exercises  Exercise Treatment: supine B LEs stretches 3x 30 seconds   Resistive Exercises: seated B LE exercises x20 reps with #1.5 on R LE and #3 on L LE and purple tband   Circulation/Endurance Exercises: standing UBE 5 minutes FWD seated for BWD's. NuStep L2 x12 minutes while keeping TTWB status       ASSESSMENT/PROGRESS TOWARDS GOALS       Assessment  Activity Tolerance: Patient tolerated treatment well  Discharge Recommendations: Home with assist PRN;Patient would benefit from continued therapy after discharge  PT Equipment Recommendations  Equipment Needed: Yes  Mobility Devices: Clayton Champagne: Rolling    Goals  Patient Goals   Patient goals : to get home and be as independent as able  Short Term Goals  Time Frame for Short term goals: 5 days  Short term goal 1: perform bed mobility SBA from flat surface  Short term goal 2: transfer STS SBA c Least restrictive assistive device from varied surface heights. Short term goal 3: Pt to amb  75'-100' on level surfaces, CGA with device. Short term goal 4: Pt to tolerate standing up to 5 minutes 1-2 UE support, CGA for safety.   Short term goal 5: Pt to improve posture and sitting balance to GOOD (Static/dynamic) to progress to higher level tasks. Long Term Goals  Time Frame for Long term goals : 10-14 days  Long term goal 1: Perform bed mobility c Mod Indep from flat surface  Long term goal 2: transfer STS Mod indep c Least restrictive assistive device from varied surfaces and heights to simulate home environment. Long term goal 3: Pt to ambulate 150ft on level and uneven surfaces with device to be able to attend [employer]. Long term goal 4: Pt to tolerate standing up to 8 minutes with Good Balance, Mod indep  Long term goal 5: pt ambulate 3 stairs with use of R railing c CGA   Long term goal 6: Pt to propel w/c on varied surfaces, MOD I up to 250'. Long term goal 7: Pt to perform 2MWT with device 75' on level surface with device, MOD i. Long term goal 8: Pt to improve BLE strength by 1 MMG. Long term goal 9: Pt to improve standing balance (Static/dynamic) to GOOD- to reduce risk for falls. Long term goal 10: Pt to complete family training and demo good technique for HEP. PLAN OF CARE/SAFETY  Plan  Plan:  minutes of therapy at least 5 out of 7 days a week  Specific Instructions for Next Treatment: progress gait, standing balance, endurance with pain control  Current Treatment Recommendations: Strengthening;Balance training;Functional mobility training;Transfer training; Endurance training;Gait training;Home exercise program;Safety education & training;Patient/Caregiver education & training;Equipment evaluation, education, & procurement;ROM;Wheelchair mobility training;Stair training;Pain management;Positioning  Safety Devices  Type of Devices: Call light within reach;Nurse notified;Gait belt; All fall risk precautions in place; Left in bed  Restraints  Restraints Initially in Place: No       06/08/22 1156 06/08/22 1532   PT Individual Minutes   Time In 0902 1430   Time Out 1006 1522   Minutes 64 52     Electronically signed by Kuldeep Millan PTA on 6/8/22 at 3:35 PM EDT

## 2022-06-08 NOTE — PLAN OF CARE
Problem: Safety - Adult  Goal: Free from fall injury  Outcome: Progressing, Patient remains free of incidence/ injury. Bed remains in low position. Side rails up x2. Problem: Skin/Tissue Integrity  Goal: Absence of new skin breakdown  Description: 1. Monitor for areas of redness and/or skin breakdown  2. Assess vascular access sites hourly  3. Every 4-6 hours minimum:  Change oxygen saturation probe site  4. Every 4-6 hours:  If on nasal continuous positive airway pressure, respiratory therapy assess nares and determine need for appliance change or resting period. Outcome: Progressing, Pt remain free of skin breakdown. Educated on the importance of turning and alternating position. Problem: Pain  Goal: Verbalizes/displays adequate comfort level or baseline comfort level  Outcome: Progressing, Pt educated on non-pharmacological pain interventions. PRN pain medications administered. Problem: Nutrition Deficit:  Goal: Optimize nutritional status  Outcome: Progressing,Pt encouraged to eat and educated about the importance of maintaining diet.

## 2022-06-08 NOTE — PROGRESS NOTES
Pt educated on how to self administer Lovenox shot. Pt states understanding, denies any questions/concerns at this time. Will f/u with education this evening during administration.
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
## 2022-06-08 NOTE — PROGRESS NOTES
Occupational Therapy  Facility/Department: [facility] ACUTE REHAB  Rehabilitation Occupational Therapy Daily Treatment Note    Date: 22  Patient Name: Enrique Burns       Room: 2612/2612-01  MRN: [id number]  Account: [de-identified]   : 1975  (48 y.o.) Gender: male                    Past Medical History:  has a past medical history of Asthma and Hypertension. Past Surgical History:   has a past surgical history that includes Finger replantation; Bony pelvis surgery (Right, 2022); and Pelvic fracture surgery (Right, 2022). Restrictions  Restrictions/Precautions: Weight Bearing; Fall Risk  Implants present? : Metal implants  Other position/activity restrictions: OK to shower per ortho Dr. Gabriel Rao  Right Lower Extremity Weight Bearing: Toe Touch Weight Bearing  Required Braces or Orthoses?: No    Subjective  Subjective: Hello there. I was told I need to take a shower so that you can assess how I do before I go home. \"  Pain Level: 6  Pain Location: Hip  Pain Orientation: Right  Restrictions/Precautions: Weight Bearing; Fall Risk        Pain Assessment  Pain Assessment: 0-10  Pain Level: 6  Pain Location: Hip  Pain Orientation: Right  Pain Descriptors: Discomfort  Functional Pain Assessment: Activities are not prevented    Objective     Cognition  Overall Cognitive Status: WNL  Patient affect[de-identified] Normal  Orientation  Overall Orientation Status: Within Normal Limits         ADL  Feeding  Assistance Level: Independent  Grooming/Oral Hygiene  Assistance Level: Modified independent  Skilled Clinical Factors: Completed while seated in w/c at sink. Upper Extremity Bathing  Assistance Level: Modified independent  Skilled Clinical Factors: While seated on shower bench. Lower Extremity Bathing  Equipment Provided: Long-handled sponge  Assistance Level: Modified independent  Skilled Clinical Factors: Seated on tub bench. Weight shifting for washing of buttocks and mel area.  G use of long handled sponge for B feet.   Upper Extremity Dressing  Assistance Level: Modified independent  Skilled Clinical Factors: Retreiving from location at bed side. [place] and donning overhead tshirt while seated on tub bench. Lower Extremity Dressing  Assistance Level: Supervision  Skilled Clinical Factors: Pt. demonstrated ability to mara pajama pants with use of AE this date. Putting On/Taking Off Footwear  Equipment Provided: Sock aid  Assistance Level: Supervision  Skilled Clinical Factors: Good use of donning gripper socks with use of sock aid. Toileting  Assistance Level: Modified independent  Skilled Clinical Factors: standing at GB to urinate  Toilet Transfers  Equipment: Grab bars  Assistance Level: Modified independent  Skilled Clinical Factors: Pt able to stand at toilet and urinate with good safety noted. Tub/Shower Transfers  Type: Shower  Transfer From: Rolling walker  Transfer To: Tub transfer bench  Additional Factors: Increased time to complete  Assistance Level: Supervision    Light Housekeeping  Light Housekeeping Level: Wheelchair  Light Housekeeping Level of Assistance: Supervision  Light Housekeeping: pt. demonstrating ability to open/close dryer door, and retreive and fold his clean clothes while seated in wheelchair. Reacher was used for clothing items too far out of base of support for safety. Functional Mobility  Device: Rolling walker; Wheelchair  Activity: To/From bathroom; To/From therapy gym  Assistance Level: Supervision  Skilled Clinical Factors: Good safety noted. Use of rw for mobility within room, wheelchair utilized throughout hallways of facility.   Roll Left  Assistance Level: Modified independent  Roll Right  Assistance Level: Modified independent  Sit to Supine  Assistance Level: Modified independent  Skilled Clinical Factors: with use of leg  for RLE  Supine to Sit  Assistance Level: Modified independent  Skilled Clinical Factors: with use of leg  for RLE  Scooting  Assistance Level: Modified independent  Skilled Clinical Factors: Able to pull himself up in bed. Able to scoot closer to EOB safely. Transfers  Surface: From bed; To bed; Wheelchair; To chair with arms;Standard toilet (Shower bench.)  Sit to Stand  Assistance Level: Modified independent  Stand to Sit  Assistance Level: Modified independent  Stand Pivot  Assistance Level: Modified independent   OT Exercises  Exercise Treatment: Instruction and participation in B UE str exercises with 3# free wt, 20+ reps in all planes, rest breaks taken as needed, to increase/maintain general str and activity tolerance to allow for ability to complete daily transfers while adhering to TTWB precautions of R LE. Assessment  Assessment  Activity Tolerance: Patient tolerated treatment well  Discharge Recommendations: Home with assist PRN  OT Equipment Recommendations  Equipment Needed: Yes  Mobility Devices: ADL Assistive Devices  Walker: Rolling  Wheelchair: Standard; Wheelchair Cushion Standard  ADL Assistive Devices: Transfer Tub Bench  Other: d/c planning: Pt will benefit from use of TTB for bathing tasks at home. Reports he may have a TTB at home and will have family check. Safety Devices  Safety Devices in place: Yes  Type of devices: Left in bed;Call light within reach; Patient at risk for falls    Patient Education  Education  Education Given To: Patient  Education Provided: Plan of Care;Precautions; Safety; Mobility Training;Energy Conservation  Education Method: Verbal;Demonstration; Teach Back  Barriers to Learning: None  Education Outcome: Verbalized understanding;Demonstrated understanding    Plan  Plan  Times per Week: 5-7  Times per Day: Twice a day  Current Treatment Recommendations: Self-Care / ADL; Home management training;Strengthening;Balance training;Functional mobility training; Endurance training; Wheelchair mobility training;Pain management; Safety education & training;Patient/Caregiver education & training;Equipment

## 2022-06-08 NOTE — FLOWSHEET NOTE
Patient talked about his current medical issues and his discharge from rehab tomorrow; patient shared his medical history, including his surgeries and their recovery; patient also talked about his employment, his children, and his relationship with his sig other; patient has strong [place] edmundo which helps him cope, and talked about his spiritual beliefs; patient shared he is a really good support; listening presence and support;     06/08/22 1640   Encounter Summary   Encounter Overview/Reason  Spiritual/Emotional Needs   Service Provided For: Patient   Referral/Consult From: [facility]   Last Encounter  06/08/22   Complexity of Encounter Moderate   Begin Time 1400   End Time  1430   Total Time Calculated 30 min   Spiritual/Emotional needs   Type Spiritual Support   Assessment/Intervention/Outcome   Assessment Coping; Hopeful   Intervention Active listening;Discussed illness injury and its impact; Life review/Legacy; Explored/Affirmed feelings, thoughts, concerns;Prayer (assurance of)/[place];Sustaining Presence/Ministry of presence   Outcome Coping;Engaged in conversation;Expressed feelings, needs, and concerns;Expressed Gratitude;Receptive

## 2022-06-09 VITALS
SYSTOLIC BLOOD PRESSURE: 132 MMHG | RESPIRATION RATE: 18 BRPM | HEIGHT: 74 IN | DIASTOLIC BLOOD PRESSURE: 85 MMHG | OXYGEN SATURATION: 96 % | WEIGHT: 234 LBS | BODY MASS INDEX: 30.03 KG/M2 | TEMPERATURE: 98.6 F | HEART RATE: 68 BPM

## 2022-06-09 PROCEDURE — 6370000000 HC RX 637 (ALT 250 FOR IP): Performed by: PHYSICAL MEDICINE & REHABILITATION

## 2022-06-09 PROCEDURE — 97116 GAIT TRAINING THERAPY: CPT

## 2022-06-09 PROCEDURE — 97110 THERAPEUTIC EXERCISES: CPT

## 2022-06-09 PROCEDURE — 97530 THERAPEUTIC ACTIVITIES: CPT

## 2022-06-09 PROCEDURE — 99239 HOSP IP/OBS DSCHRG MGMT >30: CPT | Performed by: PHYSICAL MEDICINE & REHABILITATION

## 2022-06-09 PROCEDURE — 6360000002 HC RX W HCPCS: Performed by: NURSE PRACTITIONER

## 2022-06-09 PROCEDURE — 6370000000 HC RX 637 (ALT 250 FOR IP): Performed by: NURSE PRACTITIONER

## 2022-06-09 PROCEDURE — 97535 SELF CARE MNGMENT TRAINING: CPT

## 2022-06-09 RX ADMIN — LOSARTAN POTASSIUM 50 MG: 50 TABLET, FILM COATED ORAL at 08:45

## 2022-06-09 RX ADMIN — ENOXAPARIN SODIUM 30 MG: 100 INJECTION SUBCUTANEOUS at 08:46

## 2022-06-09 RX ADMIN — CARVEDILOL 25 MG: 25 TABLET, FILM COATED ORAL at 08:45

## 2022-06-09 RX ADMIN — DOCUSATE SODIUM 100 MG: 100 CAPSULE, LIQUID FILLED ORAL at 08:45

## 2022-06-09 RX ADMIN — OXYCODONE HYDROCHLORIDE 5 MG: 5 TABLET ORAL at 03:03

## 2022-06-09 RX ADMIN — BACITRACIN: 500 OINTMENT TOPICAL at 09:36

## 2022-06-09 RX ADMIN — OXYCODONE HYDROCHLORIDE 5 MG: 5 TABLET ORAL at 12:08

## 2022-06-09 RX ADMIN — METHOCARBAMOL TABLETS 500 MG: 500 TABLET, COATED ORAL at 12:08

## 2022-06-09 RX ADMIN — AMLODIPINE BESYLATE 10 MG: 10 TABLET ORAL at 08:45

## 2022-06-09 RX ADMIN — HYDROCHLOROTHIAZIDE 12.5 MG: 25 TABLET ORAL at 08:45

## 2022-06-09 ASSESSMENT — PAIN - FUNCTIONAL ASSESSMENT: PAIN_FUNCTIONAL_ASSESSMENT: ACTIVITIES ARE NOT PREVENTED

## 2022-06-09 ASSESSMENT — PAIN SCALES - GENERAL
PAINLEVEL_OUTOF10: 6
PAINLEVEL_OUTOF10: 5

## 2022-06-09 ASSESSMENT — PAIN DESCRIPTION - LOCATION: LOCATION: HIP

## 2022-06-09 ASSESSMENT — PAIN DESCRIPTION - ORIENTATION: ORIENTATION: RIGHT
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
## 2022-06-09 NOTE — PROGRESS NOTES
Patient was discharged home today 6/9/2022. AVS was discussed with patient and all questions answered. F/U appointments were discussed and patient is aware of when they are and what time. All belongings were sent with patient.

## 2022-06-09 NOTE — PROGRESS NOTES
Writer reinforced Lovenox self injection education with morning meds. Patient demonstrated full understanding and was able to perform the administration successfully. Patient denied any questions or concerns at this time. Call light is in reach.

## 2022-06-09 NOTE — PROGRESS NOTES
Sutures from left elbow were removed per Dr. Bette Bruno. Patient was educated on risk of infection and importance of still keeping elbow clean and dry. Patient verbalized understanding. IM was contacted and gave clearance for patient to discharge today 6/9/2022.

## 2022-06-09 NOTE — PROGRESS NOTES
Occupational Therapy  Facility/Department: [facility]  Rehabilitation Occupational Therapy Daily Treatment Note    Date: 22  Patient Name: Anthony Villeda       Room: 2612/2612-01  MRN: [id number]  Account: [de-identified]   : 1975  (48 y.o.) Gender: male                    Past Medical History:  has a past medical history of Asthma and Hypertension. Past Surgical History:   has a past surgical history that includes Finger replantation; Bony pelvis surgery (Right, 2022); and Pelvic fracture surgery (Right, 2022). Restrictions  Restrictions/Precautions: Weight Bearing; Fall Risk  Implants present? : Metal implants  Other position/activity restrictions: OK to shower per ortho Dr. Berna Sterling  Right Lower Extremity Weight Bearing: Toe Touch Weight Bearing  Required Braces or Orthoses?: No    Subjective  Subjective: \"Well hello. \" \"If it's okay with you I think I'd like to take another shower today before I go home. \" Pt. discharges this afternoon. Pain Level: 5  Pain Location: Hip  Pain Orientation: Right  Restrictions/Precautions: Weight Bearing; Fall Risk        Pain Assessment  Pain Assessment: 0-10  Pain Level: 5  Pain Location: Hip  Pain Orientation: Right  Pain Descriptors: Discomfort  Functional Pain Assessment: Activities are not prevented    Objective     Cognition  Overall Cognitive Status: WNL  Patient affect[de-identified] Normal  Orientation  Overall Orientation Status: Within Normal Limits         ADL  Feeding  Assistance Level: Independent  Grooming/Oral Hygiene  Assistance Level: Modified independent  Skilled Clinical Factors: Completed while standing at sink this date. Rw placed appropriately up to sink for balance and support as needed. Upper Extremity Bathing  Assistance Level: Modified independent  Skilled Clinical Factors: While seated on shower bench.    Lower Extremity Bathing  Equipment Provided: Long-handled sponge  Assistance Level: Modified independent  Skilled Clinical Factors: Seated on tub bench. Weight shifting for washing of buttocks and mel area. G use of long handled sponge for B feet. Upper Extremity Dressing  Assistance Level: Modified independent  Skilled Clinical Factors: Retreiving from location at bed side. [place] and donning overhead tshirt while seated on tub bench. Lower Extremity Dressing  Assistance Level: Modified independent  Skilled Clinical Factors: Pt. demonstrated ability to mara shorts without use of AE this date. Putting On/Taking Off Footwear  Equipment Provided: Sock aid  Assistance Level: Modified independent  Skilled Clinical Factors: Good use of donning regular ankle socks with use of sock aid. Toileting  Assistance Level: Modified independent  Toilet Transfers  Technique:  (Ambulating)  Equipment: Grab bars  Assistance Level: Modified independent  Skilled Clinical Factors: G safety awareness. Appropriate use of rw and grab bars as needed. Tub/Shower Transfers  Type: Shower  Transfer From: Rolling walker  Transfer To: Tub transfer bench  Additional Factors: Increased time to complete  Assistance Level: Modified independent          Functional Mobility  Device: Rolling walker  Activity: To/From bathroom  Assistance Level: Modified independent  Skilled Clinical Factors: G safety awareness and adherence to TTWB precautions. Roll Left  Assistance Level: Modified independent  Roll Right  Assistance Level: Modified independent  Sit to Supine  Assistance Level: Modified independent  Supine to Sit  Assistance Level: Modified independent  Scooting  Assistance Level: Modified independent  Skilled Clinical Factors: Able to pull himself up in bed. Able to scoot closer to EOB safely. Transfers  Surface: From bed; To bed;Standard toilet (Shower bench.)  Device: Walker (Utilizes rw to transfer safely to/from all surfaces.)  Sit to Stand  Assistance Level: Modified independent  Stand to Sit  Assistance Level: Modified independent  Bed To/From Chair  Assistance Level: Modified independent  Stand Pivot  Assistance Level: Modified independent         Assessment  Assessment  Activity Tolerance: Patient tolerated treatment well  Discharge Recommendations: Home with assist PRN  OT Equipment Recommendations  Equipment Needed: Yes  Mobility Devices: ADL Assistive Devices  Walker: Rolling  Wheelchair: Standard; Wheelchair Cushion Standard  ADL Assistive Devices: Transfer Tub Bench  Safety Devices  Safety Devices in place: Yes  Type of devices: Left in bed;Call light within reach; Patient at risk for falls    Patient Education  Education  Education Given To: Patient  Education Provided: Plan of Care;Precautions; Safety; Mobility Training;Energy Conservation  Education Method: Verbal;Demonstration; Teach Back  Barriers to Learning: None  Education Outcome: Verbalized understanding;Demonstrated understanding    Plan  Plan  Times per Week: 5-7  Times per Day: Twice a day  Current Treatment Recommendations: Self-Care / ADL; Home management training;Strengthening;Balance training;Functional mobility training; Endurance training; Wheelchair mobility training;Pain management; Safety education & training;Patient/Caregiver education & training;Equipment evaluation, education, & procurement    Goals  Patient Goals   Patient goals : \"To walk with a walker with the pain level between 2-3\"  Short Term Goals  Time Frame for Short term goals: One week  Short Term Goal 1: Patient will verbalize/demontrate Good understanding of assistive equipment/durable medical equipment/modified techniques for increased IND with self-care. Short Term Goal 2: Patient will perform upper body bathing/dressing with setup. Short Term Goal 3: Patient will perform lower body bathing/dressing and toileting tasks with Minimal assist and Good compliance with TTWB R LE. Short Term Goal 4: Patient will verbalize/demonstrate Good understanding of TTWB R LE with regard to self-care.   Short Term Goal 5: Patient will actively participate in 30+ minutes of therapeutic exercise/functional activities to promote increased IND with self-care and mobility. Long Term Goals  Time Frame for Long term goals : By discharge  Long Term Goal 1: Patient will perform BADLs with Modified IND and Good compliance with TTWB R LE. Long Term Goal 2: Patient will perform functional mobility and transfers during self-care with Modified IND, least restrictive mobility device, and Good compliance with TTWB R LE. Long Term Goal 3: Patient will verbalize/demonstrate Good understanding of Fall Prevention Strategies for increased safety and IND. Long Term Goal 4: Patient will perform simple meal prep/light housekeeping with modified IND. AM-PAC Score               Therapy Time   Individual Concurrent Group Co-treatment   Time In 1030         Time Out 1130         Minutes 60               No afternoon session- patient discharging home.     KHURRAM Posey/ARSEN

## 2022-06-09 NOTE — PLAN OF CARE
Operative/Inv Procedure Report
Surgery Date: 08/23/18
Name of Procedure:
Cataract extraction with intraocular lens implantation right eye
Pre-Operative Diagnosis:
Age-related cataract right eye
Post-Operative Diagnosis:
Same
Estimated Blood Loss: none
Surgeon/Assistant:
Chaitanya Saha MD
 
Anesthesia: local monitored anesthesi
Complications:
None
 
Operative/Procedure Note
Note:
Preoperatively the patient was noted to have 20/80 vision in the right eye    . 
 
The risks, benefits, and alternatives to surgery were discussed at length with 
the patient.  Informed consent was obtained.  The patient was brought to the 
operating room where the right eye was prepped and draped in the normal sterile 
fashion. 
 
 A speculum was placed on the right eye with good exposure.  The axis of the 
limbal relaxing incision was marked.  Using a wade blade at 600  depth, an 
incision spanning 45 degrees was made without complication.  A limbal relaxing 
incision of equal length and depth was made 180 away.A stab incision was made 
using a paracentesis blade.  Intracameral lidocaine was placed.  Viscoelastic 
was used to form the anterior chamber.  A clear corneal incision was made using 
keratome blade.  A continuous curvilinear capsulorrhexis was made using a 
cystotome needle followed by Utrata forceps.  There was no extension of the 
rhexis.  Hydrodissection was performed using balanced salt solution.  The 
cataract was removed using a stop and chop technique.  Residual cortex was 
removed using coaxial irrigation and aspiration.  The capsule was polished using
irrigation and aspiration and the posterior capsule was cleaned using a balanced
salt solution jet.  There was no residual lens material inside the eye.  The 
capsular bag was reformed using viscoelastic.  
 
An intraocular lens PCBOO of power 20.5 was verified and confirmed.  It was 
loaded into an injector and injected into the eye.  The lens was placed entirely
within the capsular bag.  Viscoelastic was evacuated using irrigation and 
aspiration.  The wounds were stromally hydrated and the eye filled to 
physiologic pressure using balanced salt solution.  Intracameral cefuroxime was 
placed.  Speculum was removed and a shield was placed on the eye.  The patient 
was brought to the recovery area without incident.  Instructions were given to 
follow-up the next day for routine postoperative care. Problem: Discharge Planning  Goal: Discharge to home or other facility with appropriate resources  6/9/2022 1207 by Melia Asif LPN  Outcome: Completed  6/9/2022 0435 by Jesus Aceves RN  Outcome: Progressing  Flowsheets (Taken 6/8/2022 2001)  Discharge to home or other facility with appropriate resources:   Identify barriers to discharge with patient and caregiver   Arrange for needed discharge resources and transportation as appropriate   Identify discharge learning needs (meds, wound care, etc)   Refer to discharge planning if patient needs post-hospital services based on physician order or complex needs related to functional status, cognitive ability or social support system     Problem: Safety - Adult  Goal: Free from fall injury  6/9/2022 1207 by Melia Asif LPN  Outcome: Completed  6/9/2022 0435 by Jesus Aceves RN  Outcome: Progressing  [address] (Taken 6/8/2022 2001)  Free From Fall Injury:   Instruct family/caregiver on patient safety   Based on caregiver fall risk screen, instruct family/caregiver to ask for assistance with transferring infant if caregiver noted to have fall risk factors     Problem: Skin/Tissue Integrity  Goal: Absence of new skin breakdown  Description: 1. Monitor for areas of redness and/or skin breakdown  2. Assess vascular access sites hourly  3. Every 4-6 hours minimum:  Change oxygen saturation probe site  4. Every 4-6 hours:  If on nasal continuous positive airway pressure, respiratory therapy assess nares and determine need for appliance change or resting period.   6/9/2022 1207 by Melia Asif LPN  Outcome: Completed  6/9/2022 0435 by Jesus Aceves RN  Outcome: Progressing     Problem: ABCDS Injury Assessment  Goal: Absence of physical injury  6/9/2022 1207 by Melia Asif LPN  Outcome: Completed  6/9/2022 0435 by Jesus Aceves RN  Outcome: Progressing  Flowsheets (Taken 6/8/2022 2001)  Absence of Physical Injury: Implement safety measures based on patient assessment     Problem: Pain  Goal: Verbalizes/displays adequate comfort level or baseline comfort level  6/9/2022 1207 by Darrius Velasquez LPN  Outcome: Completed  6/9/2022 0435 by Gualberto Tarango RN  Outcome: Progressing  Flowsheets (Taken 6/8/2022 2001)  Verbalizes/displays adequate comfort level or baseline comfort level:   Encourage patient to monitor pain and request assistance   Administer analgesics based on type and severity of pain and evaluate response   Implement non-pharmacological measures as appropriate and evaluate response   Consider cultural and social influences on pain and pain management   Notify Licensed Independent Practitioner if interventions unsuccessful or patient reports new pain   Assess pain using appropriate pain scale     Problem: Nutrition Deficit:  Goal: Optimize nutritional status  6/9/2022 1207 by Darrius Velasquez LPN  Outcome: Completed  6/9/2022 0435 by Gualberto Tarango RN  Outcome: Progressing
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
## 2022-06-10 ENCOUNTER — OFFICE VISIT (OUTPATIENT)
Dept: ORTHOPEDIC SURGERY | Age: 47
End: 2022-06-10

## 2022-06-10 VITALS — HEIGHT: 74 IN | BODY MASS INDEX: 30.03 KG/M2 | WEIGHT: 234 LBS

## 2022-06-10 DIAGNOSIS — S32.401A CLOSED DISPLACED FRACTURE OF RIGHT ACETABULUM, UNSPECIFIED PORTION OF ACETABULUM, INITIAL ENCOUNTER (HCC): Primary | ICD-10-CM

## 2022-06-10 PROCEDURE — 99024 POSTOP FOLLOW-UP VISIT: CPT | Performed by: NURSE PRACTITIONER

## 2022-06-10 NOTE — PROGRESS NOTES
[facility]  [id number] [facility]  Dept Phone: [phone]  Dept Fax: [phone]      Orthopaedic Trauma Clinic Follow Up      Subjective:   Date of Surgery: 5/27/2022    Kathy Syed is a 48y.o. year old male who presents to the clinic today for routine follow up 14 days post operatively from open reduction internal fixation of his right posterior wall and posterior column acetabulum fracture. Patient states he was recently discharged from inpatient rehab at [facility] and is now back home. Patient states he has constant pain in the pelvis and right sided rib fractures that are worse with activity. States pain is controlled with use of Roxicodone and Robaxin as needed. States he has been compliant with toe touch weight bearing on the right lower extremity with the use of a walker. Denies any numbness or tingling. Denies any new injuries or falls. Review of Systems  Gen: no fever, chills, malaise  CV: no chest pain or palpitations  Resp: no cough or shortness of breath  GI: no nausea, vomiting, diarrhea, or constipation  Neuro: no seizures, vertigo, or headache  Msk: pelvic pain, rib pain   10 remaining systems reviewed and negative    Objective : There were no vitals filed for this visit. Body mass index is 30.04 kg/m². General: No acute distress, resting comfortably in the clinic  Neuro: alert. oriented  Eyes: Extra-ocular muscles intact  Pulm: Respirations unlabored and regular. Skin: warm, well perfused  Psych:   Patient has good fund of knowledge and displays understanding of exam, diagnosis, and plan. Pelvis/BLE:  Skin intact, incisions healing without evidence of dehiscence or infection. Sensation intact to light touch. Compartments soft. 2+ DP pulse. TA/EHL/FHL/GS motor intact. Deep and Superficial Peroneal/Saphenous/Sural SILT. Radiology:  No radiographs obtained today.       Assessment:   48y.o. year old male with ORIF right posterior wall and posterior column acetabulum fracture; DOS: 5/27/2022. Plan:   - Staples removed, steri strips applied. Instructed to wash incisions daily with soap and water, leaving steri strips in place until they naturally fall off. Do not submerge incisions in bath tub/hot tub/swimming pool. - Maintain TTWB to the RLE.  - F/u in 4 weeks with x-rays     Follow up:Return in about 4 weeks (around 7/8/2022) for x-rays. No orders of the defined types were placed in this encounter. No orders of the defined types were placed in this encounter. Electronically signed by VALDEZ Hernández CNP on 6/10/2022 at 12:22 PM    This note is created with the assistance of a speech recognition program.  While intending to generate a document that actually reflects the content of the visit, the document can still have some errors including those of syntax and sound a like substitutions which may escape proof reading.   In such instances, actual meaning can be extrapolated by contextual diversion

## 2022-06-16 ENCOUNTER — HOSPITAL ENCOUNTER (OUTPATIENT)
Age: 47
Setting detail: SPECIMEN
Discharge: HOME | End: 2022-06-16
Payer: COMMERCIAL

## 2022-06-16 LAB
ABSOLUTE EOS #: 0 K/UL (ref 0–0.4)
ABSOLUTE LYMPH #: 1.36 K/UL (ref 1–4.8)
ABSOLUTE MONO #: 0.2 K/UL (ref 0.1–1.3)
ANION GAP SERPL CALCULATED.3IONS-SCNC: 12 MMOL/L (ref 9–17)
BASOPHILS # BLD: 0 % (ref 0–2)
BASOPHILS ABSOLUTE: 0 K/UL (ref 0–0.2)
BUN BLDV-MCNC: 12 MG/DL (ref 6–20)
CALCIUM SERPL-MCNC: 9.9 MG/DL (ref 8.6–10.4)
CHLORIDE BLD-SCNC: 102 MMOL/L (ref 98–107)
CO2: 24 MMOL/L (ref 20–31)
CREAT SERPL-MCNC: 0.86 MG/DL (ref 0.7–1.2)
EOSINOPHILS RELATIVE PERCENT: 0 % (ref 0–4)
GFR AFRICAN AMERICAN: >60 ML/MIN
GFR NON-AFRICAN AMERICAN: >60 ML/MIN
GFR SERPL CREATININE-BSD FRML MDRD: NORMAL ML/MIN/{1.73_M2}
GLUCOSE BLD-MCNC: 83 MG/DL (ref 70–99)
HCT VFR BLD CALC: 33.6 % (ref 41–53)
HEMOGLOBIN: 11 G/DL (ref 13.5–17.5)
LYMPHOCYTES # BLD: 40 % (ref 24–44)
MCH RBC QN AUTO: 28.4 PG (ref 26–34)
MCHC RBC AUTO-ENTMCNC: 32.7 G/DL (ref 31–37)
MCV RBC AUTO: 86.9 FL (ref 80–100)
MONOCYTES # BLD: 6 % (ref 1–7)
MORPHOLOGY: ABNORMAL
MORPHOLOGY: ABNORMAL
PDW BLD-RTO: 15.6 % (ref 11.5–14.9)
PLATELET # BLD: 335 K/UL (ref 150–450)
PMV BLD AUTO: 6.5 FL (ref 6–12)
POTASSIUM SERPL-SCNC: 4 MMOL/L (ref 3.7–5.3)
RBC # BLD: 3.87 M/UL (ref 4.5–5.9)
SEG NEUTROPHILS: 54 % (ref 36–66)
SEGMENTED NEUTROPHILS ABSOLUTE COUNT: 1.84 K/UL (ref 1.3–9.1)
SODIUM BLD-SCNC: 138 MMOL/L (ref 135–144)
WBC # BLD: 3.4 K/UL (ref 3.5–11)

## 2022-06-16 PROCEDURE — 85025 COMPLETE CBC W/AUTO DIFF WBC: CPT

## 2022-06-16 PROCEDURE — 80048 BASIC METABOLIC PNL TOTAL CA: CPT

## 2022-06-23 ENCOUNTER — HOSPITAL ENCOUNTER (OUTPATIENT)
Age: 47
Setting detail: SPECIMEN
Discharge: HOME | End: 2022-06-23
Payer: COMMERCIAL

## 2022-06-23 LAB
ABSOLUTE EOS #: 0.23 K/UL (ref 0–0.4)
ABSOLUTE LYMPH #: 1.17 K/UL (ref 1–4.8)
ABSOLUTE MONO #: 0.55 K/UL (ref 0.1–1.3)
BASOPHILS # BLD: 0 % (ref 0–2)
BASOPHILS ABSOLUTE: 0 K/UL (ref 0–0.2)
EOSINOPHILS RELATIVE PERCENT: 6 % (ref 0–4)
HCT VFR BLD CALC: 32.9 % (ref 41–53)
HEMOGLOBIN: 10.6 G/DL (ref 13.5–17.5)
LYMPHOCYTES # BLD: 30 % (ref 24–44)
MCH RBC QN AUTO: 28.1 PG (ref 26–34)
MCHC RBC AUTO-ENTMCNC: 32.3 G/DL (ref 31–37)
MCV RBC AUTO: 87 FL (ref 80–100)
MONOCYTES # BLD: 14 % (ref 1–7)
MORPHOLOGY: ABNORMAL
MORPHOLOGY: ABNORMAL
PDW BLD-RTO: 15.1 % (ref 11.5–14.9)
PLATELET # BLD: 346 K/UL (ref 150–450)
PMV BLD AUTO: 6.7 FL (ref 6–12)
RBC # BLD: 3.78 M/UL (ref 4.5–5.9)
SEG NEUTROPHILS: 50 % (ref 36–66)
SEGMENTED NEUTROPHILS ABSOLUTE COUNT: 1.95 K/UL (ref 1.3–9.1)
WBC # BLD: 3.9 K/UL (ref 3.5–11)

## 2022-06-23 PROCEDURE — 85025 COMPLETE CBC W/AUTO DIFF WBC: CPT

## 2022-07-07 DIAGNOSIS — S32.401A CLOSED DISPLACED FRACTURE OF RIGHT ACETABULUM, UNSPECIFIED PORTION OF ACETABULUM, INITIAL ENCOUNTER (HCC): Primary | ICD-10-CM

## 2022-07-12 ENCOUNTER — OFFICE VISIT (OUTPATIENT)
Dept: ORTHOPEDIC SURGERY | Age: 47
End: 2022-07-12

## 2022-07-12 VITALS — HEIGHT: 74 IN | WEIGHT: 234 LBS | BODY MASS INDEX: 30.03 KG/M2

## 2022-07-12 DIAGNOSIS — S32.401A CLOSED DISPLACED FRACTURE OF RIGHT ACETABULUM, UNSPECIFIED PORTION OF ACETABULUM, INITIAL ENCOUNTER (HCC): Primary | ICD-10-CM

## 2022-07-12 PROCEDURE — 99024 POSTOP FOLLOW-UP VISIT: CPT | Performed by: ORTHOPAEDIC SURGERY

## 2022-07-12 NOTE — PROGRESS NOTES
[facility]  [id number] [facility]  Dept Phone: [phone]  Dept Fax: [phone]      Orthopaedic Trauma Clinic Follow Up      Subjective:   Date of Surgery: 5/27/2022    Pablo Kelley is a 48y.o. year old male who presents to the clinic today for routine follow up 6 weeks status post ORIF of his right posterior wall and posterior column acetabulum fracture. Patient states he has been compliant with toe touch weight bearing to the right lower extremity. States he has physical therapy twice a week at his house and they have helped him learn how to safely ambulate and be more independent. Patient states he continues to do his home exercises throughout the day and has noticed that the pain has  significantly improved. States if he has pain it typically occurs in his right buttocks/hip, at night, difficulty finding a comfortable position and pain with lying on the right side. States he recently noticed a pins and needle feeling in the bottom of his right foot if he lays a certain way but states if he repositions it immediately goes away. Denies any other numbness or tingling. Denies any new injuries or falls. Review of Systems  Gen: no fever, chills, malaise  CV: no chest pain or palpitations  Resp: no cough or shortness of breath  GI: no nausea, vomiting, diarrhea, or constipation  Neuro: no seizures, vertigo, or headache  Msk: right gluteal pain   10 remaining systems reviewed and negative    Objective : There were no vitals filed for this visit. Body mass index is 30.04 kg/m². General: No acute distress, resting comfortably in the clinic  Neuro: alert. oriented  Eyes: Extra-ocular muscles intact  Pulm: Respirations unlabored and regular. Skin: warm, well perfused  Psych:   Patient has good fund of knowledge and displays understanding of exam, diagnosis, and plan. BLE/Pelvis: Skin intact. Incisions well approximated and healing without evidence of infection. Mild TTP about the abductors. Negative log roll. Equal internal and external rotation of the hips. Able to straight leg raise. No pain with hip flexion. Compartments soft. 2+ DP pulse. TA/EHL/FHL/GS motor intact. Deep and Superficial Peroneal/Saphenous/Sural SILT. Radiology:  History: ORIF right posterior wall and posterior column acetabulum     Comparison: 5/27/2022    Findings: 5 views of the pelvis (AP, inlet, outlet, judets) in a skeletally mature patient showing internal fixation of his right posterior wall and posterior column acetabulum fractures with two plates and multiple screws. There is evidence of callus formation around the fracture site. No orthopedic hardware complications or loss of fixation. No acute fractures or dislocations. Impression: Stable ORIF right posterior wall and posterior column acetabulum fractures with routine healing. Assessment:   48y.o. year old male with ORIF right posterior wall and posterior column acetabulum; DOS: [id number]. Plan:   - Continue toe touch weight bearing to the right lower extremity for another 4 weeks. - F/u in 4 weeks w x-rays, anticipate progressing to weight bearing and starting outpatient PT at that time. Follow up:Return in about 4 weeks (around 8/9/2022) for x-rays. No orders of the defined types were placed in this encounter.          Orders Placed This Encounter   Procedures    External Referral To Physical Therapy     Referral Priority:   Routine     Referral Type:   Eval and Treat     Referral Reason:   Specialty Services Required     Requested Specialty:   Physical Therapist     Number of Visits Requested:   1       Electronically signed by VALDEZ Mcfarlane CNP on 7/12/2022 at 5:43 PM    This note is created with the assistance of a speech recognition program.  While intending to generate a document that actually reflects the content of the visit, the document can still have some errors including those of syntax and sound a like substitutions which may escape proof reading.   In such instances, actual meaning can be extrapolated by contextual diversion

## 2022-08-04 DIAGNOSIS — S32.401A CLOSED DISPLACED FRACTURE OF RIGHT ACETABULUM, UNSPECIFIED PORTION OF ACETABULUM, INITIAL ENCOUNTER (HCC): Primary | ICD-10-CM

## 2022-08-09 ENCOUNTER — OFFICE VISIT (OUTPATIENT)
Dept: ORTHOPEDIC SURGERY | Age: 47
End: 2022-08-09

## 2022-08-09 VITALS — HEIGHT: 74 IN | BODY MASS INDEX: 30.03 KG/M2 | WEIGHT: 234 LBS

## 2022-08-09 DIAGNOSIS — S32.401D CLOSED DISPLACED FRACTURE OF RIGHT ACETABULUM WITH ROUTINE HEALING, UNSPECIFIED PORTION OF ACETABULUM, SUBSEQUENT ENCOUNTER: Primary | ICD-10-CM

## 2022-08-09 PROCEDURE — 99024 POSTOP FOLLOW-UP VISIT: CPT | Performed by: ORTHOPAEDIC SURGERY

## 2022-08-09 NOTE — PROGRESS NOTES
I performed the subjective and objective examination of the patient and discussed management with the resident/CNP. I reviewed the resident/CNPs note. Any areas of disagreement were adjusted in the chart. I have personally evaluated this patient and have completed at least one if not all key elements of the E/M (history, physical exam, and MDM). Additional findings are as noted. I agree with the chief complaint, past medical history, past surgical history, allergies, medications, social and family history as documented unless otherwise noted below. I personally reviewed and interpreted any relevant images obtained as related to the diagnoses as listed below. Electronically signed by Mandy Coppola DO on 11/22/2022 at 9:29 PM    This note is created with the assistance of a speech recognition program.  While intending to generate a document that actually reflects the content of the visit, the document can still have some errors including those of syntax and sound a like substitutions which may escape proof reading. In such instances, actual meaning can be extrapolated by contextual diversion      [address]  [address]  Dept: [phone]  Dept Fax: [phone]        Orthopaedic Trauma Clinic Follow Up      Subjective:   Date of Surgery: 5/27/2022     Bennett Agosto is a 48y.o. year old male who presents to the clinic today for stop follow-up from ORIF of his right posterior wall and posterior column acetabular fracture. Patient is 11 weeks postop today. He has been compliant with the toe-touch weightbearing to his right lower extremity. He is using a walker and going to physical therapy. He is also working on home exercises. He reports that he has very minimal pain at this point. He states he is more limited by stiffness.   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 At his last visit he had the complaint of some numbness and tingling on the right plantar foot which has been improving since his last visit. Denies any new falls or injuries since we last saw him. Review of Systems  Gen: no fever, chills, malaise  CV: no chest pain or palpitations  Resp: no cough or shortness of breath  GI: no nausea, vomiting, diarrhea, or constipation  Neuro: no numbness, tingling, or weakness    Pelvis/right lower extremity: Surgical skin incision is well-healed without any signs of infection. Patient has 4/5 motor for hip flexors, quad, and abductors. No pain with logroll. Compartments soft. 2+ DP pulse. TA/EHL/FHL/GS motor intact. Deep and Superficial Peroneal/Saphenous/Sural SILT. 10 remaining systems reviewed and negative    Objective : There were no vitals filed for this visit. Body mass index is 30.04 kg/m². General: No acute distress, resting comfortably in the clinic  Neuro: alert. oriented  Eyes: Extra-ocular muscles intact  Pulm: Respirations unlabored and regular. Skin: warm, well perfused  Psych:   Patient has good fund of knowledge and displays understanging of exam, diagnosis, and plan. MSK:  Pelvis/right lower extremity: Surgical skin incision is well-healed without any signs of infection. Patient has 4/5 motor for hip flexors, quad, and abductors. No pain with logroll. Compartments soft. 2+ DP pulse. TA/EHL/FHL/GS motor intact. Deep and Superficial Peroneal/Saphenous/Sural SILT. Radiology:  History: ORIF right posterior wall and posterior column acetabulum      Comparison: 7/12/2022     Findings: 5 views of the pelvis (AP, inlet, outlet, judets) demonstrate posterior column posterior wall acetabular fracture status post ORIF with plate and screw fixation. When comparing prior imaging studies there is interval callus formation. There is no evidence of any hardware complication or failure.     Impression: Stable ORIF right posterior wall and posterior column acetabulum fractures with routine healing. Assessment:   48 y.o. male with ORIF right posterior wall and posterior column acetabulum; DOS: 5/27/2022. Plan:   Marlo Mack is doing quite well today status post ORIF of his posterior wall and posterior column acetabulum fracture. At this time we will advance him to weightbearing as tolerated. We advised him to gradually increase his activity level as he will be deconditioned. We provided a physical therapy prescription. We will see patient back in 8 weeks for follow-up. Follow up:Return in about 8 weeks (around 10/4/2022). No orders of the defined types were placed in this encounter.          Orders Placed This Encounter   Procedures    XR PELVIS (MIN 3 VIEWS)     Standing Status:   Future     Number of Occurrences:   1     Standing Expiration Date:   8/9/2023     Scheduling Instructions:      AP, inlet/out, judets     Order Specific Question:   Reason for exam:     Answer:   monitor    External Referral To Physical Therapy     Referral Priority:   Routine     Referral Type:   Eval and Treat     Referral Reason:   Specialty Services Required     Requested Specialty:   Physical Therapist     Number of Visits Requested:   1       Electronically signed by Yadiel Dugan DO on 8/9/2022 at 3:29 PM

## 2022-10-03 RX ORDER — HYDROCHLOROTHIAZIDE 12.5 MG/1
TABLET ORAL
Qty: 90 TABLET | OUTPATIENT
Start: 2022-10-03

## 2022-10-04 ENCOUNTER — OFFICE VISIT (OUTPATIENT)
Dept: ORTHOPEDIC SURGERY | Age: 47
End: 2022-10-04

## 2022-10-04 VITALS — BODY MASS INDEX: 30.03 KG/M2 | WEIGHT: 234 LBS | HEIGHT: 74 IN

## 2022-10-04 DIAGNOSIS — S32.401D CLOSED DISPLACED FRACTURE OF RIGHT ACETABULUM WITH ROUTINE HEALING, UNSPECIFIED PORTION OF ACETABULUM, SUBSEQUENT ENCOUNTER: ICD-10-CM

## 2022-10-04 DIAGNOSIS — M25.562 PAIN AND SWELLING OF LEFT KNEE: Primary | ICD-10-CM

## 2022-10-04 DIAGNOSIS — M25.462 PAIN AND SWELLING OF LEFT KNEE: Primary | ICD-10-CM

## 2022-10-04 RX ORDER — LIDOCAINE HYDROCHLORIDE 10 MG/ML
2 INJECTION, SOLUTION INFILTRATION; PERINEURAL ONCE
Qty: 2 ML | Refills: 0 | Status: SHIPPED | OUTPATIENT
Start: 2022-10-04 | End: 2022-10-04

## 2022-10-04 RX ORDER — BUPIVACAINE HYDROCHLORIDE 2.5 MG/ML
2 INJECTION, SOLUTION INFILTRATION; PERINEURAL ONCE
Status: SHIPPED | OUTPATIENT
Start: 2022-10-04

## 2022-10-04 RX ORDER — METHYLPREDNISOLONE ACETATE 80 MG/ML
80 INJECTION, SUSPENSION INTRA-ARTICULAR; INTRALESIONAL; INTRAMUSCULAR; SOFT TISSUE ONCE
Status: SHIPPED | OUTPATIENT
Start: 2022-10-04

## 2022-10-04 NOTE — PROGRESS NOTES
[address]  [address] [facility] [address]  Dept: [phone]  Dept Fax: [phone]        Orthopaedic Trauma Clinic Follow Up      Subjective:   Date of Surgery: 5/27/22 - right posterior wall and posterior column acetabulum fracture ORIF     Adrianna Holly is a 48 y.o. male who presents to the clinic today for routine followup regarding the aforementioned procedure. Patient is 4 months out from surgery and reports that he has been working with physical therapy for strengthening and range of motion at the hip. He has been weightbearing as tolerated and up until a few weeks ago was utilizing a walker for assistance with ambulation. Several weeks ago, he transitioned to a cane and has been using this for ambulation. He does note occasional times where the right leg feels like it is going to give out on him. However, he has been noticing improvement. The patient's worst complaint today is left knee pain which was aggravated after he starting progressing to WBAT on the right leg. He states when he returned to work, he had increased his activity and then started noticing stiffness and swelling to the left knee. He states that he has never had an injury to the left knee before and never had issues prior to progressing his weightbearing status on the contralateral leg. He has been using ice to try to alleviate the swelling and was evaluated by his family physician for this who recommended an x-ray for further assessment. Review of Systems  Gen: no fever, chills, malaise  CV: no chest pain or palpitations  Resp: no cough or shortness of breath  GI: no nausea, vomiting, diarrhea, or constipation  Neuro: no numbness, tingling, or weakness  Msk: new swelling in left knee; mild pain in right hip   10 remaining systems reviewed and negative    Objective : There were no vitals filed for this visit. Body mass index is 30.04 kg/m². General: No acute distress, resting comfortably in the clinic  Neuro: alert. oriented  Eyes: Extra-ocular muscles intact  Pulm: Respirations unlabored and regular. Skin: warm, well perfused  Psych:   Patient has good fund of knowledge and displays understanging of exam, diagnosis, and plan. MSK:    Right lower extremity: No gross deformity. Able to perform straight leg raise. Able to flex the hip against resistance. Stiffness with internal/external rotation at the hip but no pain with log roll. Compartments soft. Extremity warm and well perfused. Sensation intact to light touch distally. Left lower extremity: Large effusion present along left knee. Mildly tender to palpation. ROM from 0-100 degrees, limited by feeling of stiffness and swelling. Negative varus/valgus stress test at 0 and 30 degrees. + pain with Kaylie evaluation. Negative anterior/posterior drawer testing. Non tender along joint lines bilaterally. Is able to perform straight leg raise. No abrasions or lacerations. Sensation intact to light touch. Radiology:  History: Left knee swelling     Comparison: 5/26/22    Findings: AP, lateral, notch, and merchant views of the left knee showing no clear evidence of acute osseous abnormalities. Images are weight bearing, which demonstrate joint space narrowing most significantly medially. There are no fractures. Impression: Mild-moderate osteoarthritis of the left knee      Assessment:   48y.o. year old male who is four months s/p right posterior wall and posterior column acetabulum ORIF with new complaint of left knee pain and swelling   Plan:      Patient seen and examined today. Regarding the patient's right hip/pelvis, patient can continue weight bearing as tolerated. Regarding the patient's new onset left knee swelling, we performed an aspiration in office with 100cc aspirated. A steroid injection was then administered. We will see the patient back in six weeks for reassessment. Left knee aspiration/Steroid injection:   Risks, benefits, and alternatives have been discussed regarding arthrocentesis of the joint effusion. Patient agreed to move forward with the proposed procedure. After sterile prep of the left knee we proceeded to insert an 18 gauge needle into the joint, aspirating 100 cc of straw colored joint fluid with blood tinge. Joint fluids at this point were sent to lab for analysis. We then injected the left knee with 2mL of 0.25% marcaine, 2mL of 1% lidocaine, and 80mg of depo medrol for pain relief. Patient tolerated the procedure well and expressed interval improvement in symptoms. All questions and concerns were addressed at this point. Follow up:Return in about 6 weeks (around 11/15/2022). No orders of the defined types were placed in this encounter.          Orders Placed This Encounter   Procedures    XR KNEE LEFT (MIN 4 VIEWS)     Standing Status:   Future     Number of Occurrences:   1     Standing Expiration Date:   10/4/2023     Order Specific Question:   Reason for exam:     Answer:   monitor       Electronically signed by Soila Mccray DO on 10/4/2022 at 4:41 PM

## 2022-11-14 RX ORDER — AMLODIPINE BESYLATE 10 MG/1
TABLET ORAL
Qty: 90 TABLET | OUTPATIENT
Start: 2022-11-14

## 2022-11-15 ENCOUNTER — TELEPHONE (OUTPATIENT)
Dept: ORTHOPEDIC SURGERY | Age: 47
End: 2022-11-15

## 2023-01-17 ENCOUNTER — OFFICE VISIT (OUTPATIENT)
Dept: ORTHOPEDIC SURGERY | Age: 48
End: 2023-01-17
Payer: COMMERCIAL

## 2023-01-17 VITALS — HEIGHT: 74 IN | WEIGHT: 234 LBS | BODY MASS INDEX: 30.03 KG/M2

## 2023-01-17 DIAGNOSIS — M25.462 EFFUSION OF LEFT KNEE: Primary | ICD-10-CM

## 2023-01-17 PROCEDURE — G8427 DOCREV CUR MEDS BY ELIG CLIN: HCPCS | Performed by: NURSE PRACTITIONER

## 2023-01-17 PROCEDURE — 99213 OFFICE O/P EST LOW 20 MIN: CPT | Performed by: NURSE PRACTITIONER

## 2023-01-17 PROCEDURE — G8484 FLU IMMUNIZE NO ADMIN: HCPCS | Performed by: NURSE PRACTITIONER

## 2023-01-17 PROCEDURE — 20610 DRAIN/INJ JOINT/BURSA W/O US: CPT | Performed by: NURSE PRACTITIONER

## 2023-01-17 PROCEDURE — G8417 CALC BMI ABV UP PARAM F/U: HCPCS | Performed by: NURSE PRACTITIONER

## 2023-01-17 PROCEDURE — 4004F PT TOBACCO SCREEN RCVD TLK: CPT | Performed by: NURSE PRACTITIONER

## 2023-01-17 NOTE — PROGRESS NOTES
[facility]  [address] [facility] [address] [facility]  Dept Phone: [phone]  Dept Fax: [phone]      Orthopaedic Trauma Clinic Follow Up      Subjective:   Date of Surgery: 5/27/22- ORIF right posterior wall and posterior column acetabulum fracture     Riley Garcia is a 50y.o. year old male who presents to the clinic today for routine follow up regarding his left knee swelling. Patient had a left knee arthrocentesis performed in the office at his last visit on 10/4/2022 for a large knee effusion and also received a steroid joint injection. Patient states the swelling resolved but then spontaneously returned about 3 weeks ago. States he had no new injuries or falls. States he tried ice, elevation and knee sleeve but the swelling did not resolve. Denies any pain in the knee, states the knee just gets uncomfortable to bend because of the swelling. Denies any sensation of instability of the knee. Denies any fevers or chills. In regards to his acetabulum fracture, patient states overall he is doing well just has some residual stiffness in the right hip that he knows will get better with routine stretching. Denies any pain in the hip. Review of Systems  Gen: no fever, chills, malaise  CV: no chest pain or palpitations  Resp: no cough or shortness of breath  GI: no nausea, vomiting, diarrhea, or constipation  Neuro: no seizures, vertigo, or headache  Msk: left knee swelling   10 remaining systems reviewed and negative    Objective : There were no vitals filed for this visit. Body mass index is 30.04 kg/m². General: No acute distress, resting comfortably in the clinic  Neuro: alert. oriented  Eyes: Extra-ocular muscles intact  Pulm: Respirations unlabored and regular. Skin: warm, well perfused  Psych:   Patient has good fund of knowledge and displays understanding of exam, diagnosis, and plan. LLE:  Skin intact. Palpable suprapatellar joint effusion.  No ecchymosis, erythema or lacerations. No TTP about the knee. Approximately 0-110 degrees of knee range of motion, limited by stiffness from swelling. Knee ligaments stable to varus and valgus stress with the knee flexed at 30 degrees. Compartments soft. 2+ DP pulse. TA/EHL/FHL/GS motor intact. Deep and Superficial Peroneal/Saphenous/Sural SILT. Radiology:  No radiographs obtained today. Assessment:   50y.o. year old male with a left knee effusion; Hx of ORIF right posterior wall and posterior column acetabulum fracture DOS: 5/27/22. Plan:   - Based on patient's symptoms, recurrent effusion could be related to potential small mensicus tear/injury or other soft tissue related etiology. Patient wishes to have the knee aspirated again today as he experienced significant relief last time. See procedure note below. - No evidence of infection based on symptoms, clinical exam and presentation of joint fluid. - Continue WBAT to the left lower extremity.    - If effusion persists and begins to be painful, will recommend proceeding with MRI for further evaluation.     - At this time, he may follow up as needed. - Procedure: Risks, benefits, and alternatives have been discussed regarding arthrocentesis of the joint effusion. Patient agreed to move forward with the proposed procedure. After sterile prep of the left knee we proceeded to insert an 18 gauge needle into the joint, aspirating 50 cc of straw colored and serosanguinous fluid. Patient tolerated the procedure well and expressed interval improvement in symptoms. All questions and concerns were addressed at this point. Follow up:Return if symptoms worsen or fail to improve. No orders of the defined types were placed in this encounter.          Orders Placed This Encounter   Procedures    20610 - DRAIN/INJECT LARGE JOINT BURSA       Electronically signed by VALDEZ Mcgowan CNP on 1/17/2023 at 3:26 PM    This note is created with the assistance of a speech recognition program.  While intending to generate a document that actually reflects the content of the visit, the document can still have some errors including those of syntax and sound a like substitutions which may escape proof reading.   In such instances, actual meaning can be extrapolated by contextual diversion

## 2023-03-03 ENCOUNTER — APPOINTMENT (OUTPATIENT)
Dept: CT IMAGING | Age: 48
End: 2023-03-03
Payer: OTHER MISCELLANEOUS

## 2023-03-03 ENCOUNTER — APPOINTMENT (OUTPATIENT)
Dept: GENERAL RADIOLOGY | Age: 48
End: 2023-03-03
Payer: OTHER MISCELLANEOUS

## 2023-03-03 ENCOUNTER — HOSPITAL ENCOUNTER (EMERGENCY)
Age: 48
Discharge: HOME | End: 2023-03-03
Attending: EMERGENCY MEDICINE
Payer: OTHER MISCELLANEOUS

## 2023-03-03 VITALS
TEMPERATURE: 98.1 F | BODY MASS INDEX: 29.53 KG/M2 | HEART RATE: 60 BPM | SYSTOLIC BLOOD PRESSURE: 134 MMHG | RESPIRATION RATE: 21 BRPM | OXYGEN SATURATION: 96 % | DIASTOLIC BLOOD PRESSURE: 97 MMHG | WEIGHT: 230 LBS
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
## 2023-03-03 LAB
ABSOLUTE EOS #: 0.09 K/UL (ref 0–0.4)
ABSOLUTE IMMATURE GRANULOCYTE: 0 K/UL (ref 0–0.3)
ABSOLUTE LYMPH #: 2.13 K/UL (ref 1–4.8)
ABSOLUTE MONO #: 0.42 K/UL (ref 0.1–0.8)
ANION GAP SERPL CALCULATED.3IONS-SCNC: 12 MMOL/L (ref 9–17)
ATYPICAL LYMPHOCYTE ABSOLUTE COUNT: 0.09 K/UL
ATYPICAL LYMPHOCYTES: 2 %
BASOPHILS # BLD: 0 % (ref 0–2)
BASOPHILS ABSOLUTE: 0 K/UL (ref 0–0.2)
BUN SERPL-MCNC: 17 MG/DL (ref 6–20)
CALCIUM SERPL-MCNC: 9.2 MG/DL (ref 8.6–10.4)
CHLORIDE SERPL-SCNC: 102 MMOL/L (ref 98–107)
CO2 SERPL-SCNC: 22 MMOL/L (ref 20–31)
CREAT SERPL-MCNC: 0.89 MG/DL (ref 0.7–1.2)
EOSINOPHILS RELATIVE PERCENT: 2 % (ref 1–4)
GFR SERPL CREATININE-BSD FRML MDRD: >60 ML/MIN/1.73M2
GLUCOSE SERPL-MCNC: 89 MG/DL (ref 70–99)
HCT VFR BLD AUTO: 40.3 % (ref 40.7–50.3)
HGB BLD-MCNC: 13 G/DL (ref 13–17)
IMMATURE GRANULOCYTES: 0 %
LYMPHOCYTES # BLD: 45 % (ref 24–44)
MCH RBC QN AUTO: 28.1 PG (ref 25.2–33.5)
MCHC RBC AUTO-ENTMCNC: 32.3 G/DL (ref 28.4–34.8)
MCV RBC AUTO: 87.2 FL (ref 82.6–102.9)
MONOCYTES # BLD: 9 % (ref 1–7)
MORPHOLOGY: NORMAL
NRBC AUTOMATED: 0 PER 100 WBC
PDW BLD-RTO: 13.4 % (ref 11.8–14.4)
PLATELET # BLD AUTO: 255 K/UL (ref 138–453)
PMV BLD AUTO: 9.7 FL (ref 8.1–13.5)
POTASSIUM SERPL-SCNC: 3.9 MMOL/L (ref 3.7–5.3)
RBC # BLD: 4.62 M/UL (ref 4.21–5.77)
SEG NEUTROPHILS: 42 % (ref 36–66)
SEGMENTED NEUTROPHILS ABSOLUTE COUNT: 1.97 K/UL (ref 1.8–7.7)
SODIUM SERPL-SCNC: 136 MMOL/L (ref 135–144)
WBC # BLD AUTO: 4.7 K/UL (ref 3.5–11.3)

## 2023-03-03 PROCEDURE — 3209999900 CT LUMBAR SPINE TRAUMA RECONSTRUCTION

## 2023-03-03 PROCEDURE — 71275 CT ANGIOGRAPHY CHEST: CPT

## 2023-03-03 PROCEDURE — 96374 THER/PROPH/DIAG INJ IV PUSH: CPT

## 2023-03-03 PROCEDURE — 99285 EMERGENCY DEPT VISIT HI MDM: CPT

## 2023-03-03 PROCEDURE — 6360000004 HC RX CONTRAST MEDICATION: Performed by: STUDENT IN AN ORGANIZED HEALTH CARE EDUCATION/TRAINING PROGRAM

## 2023-03-03 PROCEDURE — 29125 APPL SHORT ARM SPLINT STATIC: CPT

## 2023-03-03 PROCEDURE — 74174 CTA ABD&PLVS W/CONTRAST: CPT

## 2023-03-03 PROCEDURE — 85025 COMPLETE CBC W/AUTO DIFF WBC: CPT

## 2023-03-03 PROCEDURE — 70450 CT HEAD/BRAIN W/O DYE: CPT

## 2023-03-03 PROCEDURE — 73080 X-RAY EXAM OF ELBOW: CPT

## 2023-03-03 PROCEDURE — 73110 X-RAY EXAM OF WRIST: CPT

## 2023-03-03 PROCEDURE — 96375 TX/PRO/DX INJ NEW DRUG ADDON: CPT

## 2023-03-03 PROCEDURE — 80048 BASIC METABOLIC PNL TOTAL CA: CPT

## 2023-03-03 PROCEDURE — 72125 CT NECK SPINE W/O DYE: CPT

## 2023-03-03 PROCEDURE — 3209999900 CT THORACIC SPINE TRAUMA RECONSTRUCTION

## 2023-03-03 PROCEDURE — 73090 X-RAY EXAM OF FOREARM: CPT

## 2023-03-03 PROCEDURE — 93005 ELECTROCARDIOGRAM TRACING: CPT | Performed by: STUDENT IN AN ORGANIZED HEALTH CARE EDUCATION/TRAINING PROGRAM

## 2023-03-03 PROCEDURE — 6360000002 HC RX W HCPCS: Performed by: STUDENT IN AN ORGANIZED HEALTH CARE EDUCATION/TRAINING PROGRAM

## 2023-03-03 RX ORDER — CYCLOBENZAPRINE HCL 5 MG
5 TABLET ORAL 2 TIMES DAILY PRN
Qty: 10 TABLET | Refills: 0 | Status: SHIPPED | OUTPATIENT
Start: 2023-03-03 | End: 2023-03-13

## 2023-03-03 RX ORDER — KETOROLAC TROMETHAMINE 15 MG/ML
15 INJECTION, SOLUTION INTRAMUSCULAR; INTRAVENOUS ONCE
Status: COMPLETED | OUTPATIENT
Start: 2023-03-03 | End: 2023-03-03

## 2023-03-03 RX ORDER — MORPHINE SULFATE 4 MG/ML
4 INJECTION, SOLUTION INTRAMUSCULAR; INTRAVENOUS ONCE
Status: COMPLETED | OUTPATIENT
Start: 2023-03-03 | End: 2023-03-03

## 2023-03-03 RX ADMIN — KETOROLAC TROMETHAMINE 15 MG: 15 INJECTION, SOLUTION INTRAMUSCULAR; INTRAVENOUS at 20:47

## 2023-03-03 RX ADMIN — MORPHINE SULFATE 4 MG: 4 INJECTION INTRAVENOUS at 18:01

## 2023-03-03 RX ADMIN — IOPAMIDOL 100 ML: 755 INJECTION, SOLUTION INTRAVENOUS at 18:43

## 2023-03-03 ASSESSMENT — PAIN SCALES - GENERAL
PAINLEVEL_OUTOF10: 7
PAINLEVEL_OUTOF10: 7
PAINLEVEL_OUTOF10: 8

## 2023-03-03 ASSESSMENT — PAIN - FUNCTIONAL ASSESSMENT: PAIN_FUNCTIONAL_ASSESSMENT: 0-10

## 2023-03-03 ASSESSMENT — ENCOUNTER SYMPTOMS
BACK PAIN: 0
VOMITING: 0
ABDOMINAL PAIN: 1
SHORTNESS OF BREATH: 0

## 2023-03-03 ASSESSMENT — PAIN DESCRIPTION - ORIENTATION: ORIENTATION: LEFT

## 2023-03-03 ASSESSMENT — PAIN DESCRIPTION - LOCATION: LOCATION: RIB CAGE

## 2023-03-03 NOTE — Clinical Note
Pedro Villegas was seen and treated in our emergency department on 3/3/2023. He may return to work on 03/04/2023. Patient may return to work with limited use of left hand. If you have any questions or concerns, please don't hesitate to call.       Tanya Kern, DO
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
## 2023-03-04 NOTE — ED NOTES
Pt alert, pain addressed. Pt provided warm blankets. Pt denies any further needs. VSS. Call light within reach.       Cameron Miles RN  03/03/23 [id number]

## 2023-03-04 NOTE — ED NOTES
Pt resting on stretcher. A&Ox4. RR even and unlabored. No distress noted. Pt denies any needs at this time. Call light within reach.         Charles Sheehan, GONZÁLEZ  03/03/23 [id number]

## 2023-03-04 NOTE — DISCHARGE INSTRUCTIONS
Call tomorrow to schedule follow-up appointment with orthopedic surgery soon as possible. Please keep the splint clean and dry. Cover with a plastic bag prior to showering. Do not remove the splint unless your finger becomes numb or discolored in which case you can loosen the Ace wrap. Take Tylenol and ibuprofen as needed for pain: You can take 800 mg ibuprofen every 8 hours. You can take 1000 mg Tylenol every 8 hours. Please alternate these medications for maximal effect. For example if you take ibuprofen at noon, take Tylenol 4 PM, then repeat ibuprofen 8 PM and so on.     Return to the ED if you develop worsening pain, numbness, weakness, or other new/concerning symptoms

## 2023-03-05 LAB
EKG ATRIAL RATE: 63 BPM
EKG P AXIS: 81 DEGREES
EKG P-R INTERVAL: 214 MS
EKG Q-T INTERVAL: 430 MS
EKG QRS DURATION: 94 MS
EKG QTC CALCULATION (BAZETT): 440 MS
EKG R AXIS: -35 DEGREES
EKG T AXIS: -11 DEGREES
EKG VENTRICULAR RATE: 63 BPM

## 2023-03-07 ENCOUNTER — OFFICE VISIT (OUTPATIENT)
Dept: ORTHOPEDIC SURGERY | Age: 48
End: 2023-03-07
Payer: COMMERCIAL

## 2023-03-07 VITALS — HEIGHT: 74 IN | RESPIRATION RATE: 16 BRPM | WEIGHT: 230 LBS | BODY MASS INDEX: 29.52 KG/M2

## 2023-03-07 DIAGNOSIS — S62.023S: Primary | ICD-10-CM
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
## 2023-03-07 PROCEDURE — G8417 CALC BMI ABV UP PARAM F/U: HCPCS | Performed by: PHYSICIAN ASSISTANT

## 2023-03-07 PROCEDURE — 99214 OFFICE O/P EST MOD 30 MIN: CPT | Performed by: PHYSICIAN ASSISTANT

## 2023-03-07 PROCEDURE — G8484 FLU IMMUNIZE NO ADMIN: HCPCS | Performed by: PHYSICIAN ASSISTANT

## 2023-03-07 PROCEDURE — G8427 DOCREV CUR MEDS BY ELIG CLIN: HCPCS | Performed by: PHYSICIAN ASSISTANT

## 2023-03-07 PROCEDURE — 4004F PT TOBACCO SCREEN RCVD TLK: CPT | Performed by: PHYSICIAN ASSISTANT

## 2023-03-07 RX ORDER — BUPIVACAINE HYDROCHLORIDE 2.5 MG/ML
0.5 INJECTION, SOLUTION INFILTRATION; PERINEURAL ONCE
Status: CANCELLED | OUTPATIENT
Start: 2023-03-07 | End: 2023-03-07

## 2023-03-07 RX ORDER — BETAMETHASONE SODIUM PHOSPHATE AND BETAMETHASONE ACETATE 3; 3 MG/ML; MG/ML
6 INJECTION, SUSPENSION INTRA-ARTICULAR; INTRALESIONAL; INTRAMUSCULAR; SOFT TISSUE ONCE
Status: CANCELLED | OUTPATIENT
Start: 2023-03-07 | End: 2023-03-07

## 2023-03-07 RX ORDER — LIDOCAINE HYDROCHLORIDE 10 MG/ML
0.5 INJECTION, SOLUTION INFILTRATION; PERINEURAL ONCE
Status: CANCELLED | OUTPATIENT
Start: 2023-03-07 | End: 2023-03-07
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
## 2023-03-08 ASSESSMENT — ENCOUNTER SYMPTOMS
VOMITING: 0
COLOR CHANGE: 0
SHORTNESS OF BREATH: 0
COUGH: 0

## 2023-03-10 ENCOUNTER — TELEPHONE (OUTPATIENT)
Dept: ORTHOPEDIC SURGERY | Age: 48
End: 2023-03-10

## 2023-08-22 ENCOUNTER — HOSPITAL ENCOUNTER (OUTPATIENT)
Age: 48
Discharge: HOME | End: 2023-08-24
Payer: COMMERCIAL

## 2023-08-22 ENCOUNTER — HOSPITAL ENCOUNTER (OUTPATIENT)
Dept: GENERAL RADIOLOGY | Age: 48
Discharge: HOME | End: 2023-08-24
Payer: COMMERCIAL

## 2023-08-22 DIAGNOSIS — M25.532 LEFT WRIST PAIN: ICD-10-CM

## 2023-08-22 DIAGNOSIS — M25.551 RIGHT HIP PAIN: ICD-10-CM

## 2023-08-22 PROCEDURE — 73110 X-RAY EXAM OF WRIST: CPT

## 2023-08-22 PROCEDURE — 73502 X-RAY EXAM HIP UNI 2-3 VIEWS: CPT

## 2024-01-01 NOTE — PROGRESS NOTES
CLINICAL PHARMACY NOTE: MEDS TO BEDS    Total # of Prescriptions Filled: 6   The following medications were delivered to the patient:  · Carvedilol 25mg  · Methocarbamol 500mg  · Amlodipine 10mg  · Enoxaparin 30mg  · Hydrochlorothiazide 12.5mg  · Oxycodone 5mg    Additional Documentation:    Delivered medications to patients room x1

## (undated) DEVICE — DRESSING FOAM W4XL4IN AG SIL FACE BORD IONIC ANTIMIC ADH

## (undated) DEVICE — CYSTO/BLADDER IRRIGATION SET, REGULATING CLAMP

## (undated) DEVICE — INTENDED FOR TISSUE SEPARATION, AND OTHER PROCEDURES THAT REQUIRE A SHARP SURGICAL BLADE TO PUNCTURE OR CUT.: Brand: BARD-PARKER ® CARBON RIB-BACK BLADES

## (undated) DEVICE — GLOVE EXAM M L95IN FNGR THK35MIL PALM THK24MIL OFF WHT

## (undated) DEVICE — PAD,ABDOMINAL,5"X9",ST,LF,25/BX: Brand: MEDLINE INDUSTRIES, INC.

## (undated) DEVICE — DRESSING FOAM W4XL10IN AG SIL ADH ANTIMIC POSTOP OPTIFOAM

## (undated) DEVICE — TOTAL TRAY, 16FR 10ML SIL FOLEY, URN: Brand: MEDLINE

## (undated) DEVICE — GLOVE SURG SZ 6 THK91MIL LTX FREE SYN POLYISOPRENE ANTI

## (undated) DEVICE — COVER,MAYO STAND,STERILE: Brand: MEDLINE

## (undated) DEVICE — 3M™ IOBAN™ 2 ANTIMICROBIAL INCISE DRAPE 6650EZ: Brand: IOBAN™ 2

## (undated) DEVICE — SUTURE VCRL SZ 0 L18IN ABSRB UD L36MM CT-1 1/2 CIR J840D

## (undated) DEVICE — Device

## (undated) DEVICE — SUTURE ETHBND EXCEL SZ 2 L30IN NONABSORBABLE GRN L40MM V-37 MX69G

## (undated) DEVICE — BIT DRL 230/200MM CALIB DIA2.5MM 3 FLUT QUIK CPL

## (undated) DEVICE — DRAIN SURG 15FR SIL SMOOTH RND 1/8IN END PERF W/ TRCR RADPQ

## (undated) DEVICE — KIT EVAC 0.13IN RECT TB DIA10FR 400CC PVC 3 SPR Y CONN DRN

## (undated) DEVICE — ADHESIVE SKIN CLOSURE TOP 36 CC HI VISC DERMBND MINI

## (undated) DEVICE — SOLUTION PREP POVIDONE IOD FOR SKIN MUCOUS MEM PRIOR TO

## (undated) DEVICE — GLOVE ORANGE PI 8   MSG9080

## (undated) DEVICE — SUTURE VCRL SZ 2-0 L18IN ABSRB UD CT-1 L36MM 1/2 CIR J839D

## (undated) DEVICE — THE STERILE LIGHT HANDLE COVER IS USED WITH STERIS SURGICAL LIGHTING AND VISUALIZATION SYSTEMS.

## (undated) DEVICE — GOWN,AURORA,NONREINFORCED,LARGE: Brand: MEDLINE

## (undated) DEVICE — DRESSING,GAUZE,XEROFORM,CURAD,1"X8",ST: Brand: CURAD

## (undated) DEVICE — PROTECTOR ULN NRV PUR FOAM HK LOOP STRP ANATOMICALLY

## (undated) DEVICE — 1000 S-DRAPE TOWEL DRAPE 10/BX: Brand: STERI-DRAPE™

## (undated) DEVICE — GAUZE,SPONGE,FLUFF,6"X6.75",STRL,5/TRAY: Brand: MEDLINE

## (undated) DEVICE — GOWN,SIRUS,NONRNF,SETINSLV,XL,20/CS: Brand: MEDLINE

## (undated) DEVICE — GOWN,SIRUS,NONRNF,SETINSLV,2XL,18/CS: Brand: MEDLINE

## (undated) DEVICE — APPLICATOR MEDICATED 26 CC SOLUTION HI LT ORNG CHLORAPREP

## (undated) DEVICE — SPONGE LAP W18XL18IN WHT COT 4 PLY FLD STRUNG RADPQ DISP ST

## (undated) DEVICE — HANDPIECE SET WITH COAXIAL HIGH FLOW TIP AND SUCTION TUBE: Brand: INTERPULSE

## (undated) DEVICE — GLOVE ORANGE PI 7 1/2   MSG9075

## (undated) DEVICE — BANDAGE,GAUZE,BULKEE II,4.5"X4.1YD,STRL: Brand: MEDLINE

## (undated) DEVICE — GLOVE ORANGE PI 8 1/2   MSG9085

## (undated) DEVICE — GAUZE,SPONGE,4"X4",16PLY,XRAY,STRL,LF: Brand: MEDLINE

## (undated) DEVICE — CONTAINER,SPECIMEN,4OZ,OR STRL: Brand: MEDLINE

## (undated) DEVICE — BIT DRL L195MM DIA3.5MM QUIK CPL FOR PELV INSTR SET PRO-PAK

## (undated) DEVICE — SVMMC ORTH SPL DRP PK

## (undated) DEVICE — GLOVE ORANGE PI 7   MSG9070

## (undated) DEVICE — 3M™ IOBAN™ 2 ANTIMICROBIAL INCISE DRAPE 6651EZ: Brand: IOBAN™ 2

## (undated) DEVICE — TUBING AMB

## (undated) DEVICE — SHEET, T, LAPAROTOMY, STERILE: Brand: MEDLINE

## (undated) DEVICE — PREMIUM DRY TRAY LF: Brand: MEDLINE INDUSTRIES, INC.

## (undated) DEVICE — TOWEL,OR,DSP,ST,NATURAL,DLX,4/PK,20PK/CS: Brand: MEDLINE

## (undated) DEVICE — 1016 S-DRAPE IRRIG POUCH 10/BOX: Brand: STERI-DRAPE™

## (undated) DEVICE — COUNTER NDL 40 COUNT HLD 70 FOAM BLK ADH W/ MAG

## (undated) DEVICE — C-ARM: Brand: UNBRANDED

## (undated) DEVICE — GLOVE ORTHO 8   MSG9480

## (undated) DEVICE — BANDAGE COBAN 6 IN WND 6INX5YD FOAM

## (undated) DEVICE — BLADE CLIPPER GEN PURP NS

## (undated) DEVICE — 3M™ STERI-STRIP™ COMPOUND BENZOIN TINCTURE 40 BAGS/CARTON 4 CARTONS/CASE C1544: Brand: 3M™ STERI-STRIP™

## (undated) DEVICE — DECANTER BAG 9": Brand: MEDLINE INDUSTRIES, INC.

## (undated) DEVICE — 1010 S-DRAPE TOWEL DRAPE 10/BX: Brand: STERI-DRAPE™

## (undated) DEVICE — STOCKINETTE,IMPERVIOUS,12X48,STERILE: Brand: MEDLINE

## (undated) DEVICE — TUBING SUCT 12FR MAL ALUM SHFT FN CAP VENT UNIV CONN W/ OBT

## (undated) DEVICE — TUBING, SUCTION, 9/32" X 20', STRAIGHT: Brand: MEDLINE INDUSTRIES, INC.

## (undated) DEVICE — CLEANER,CAUTERY TIP,2X2",STERILE: Brand: MEDLINE

## (undated) DEVICE — OPTIFOAM GENTLE AG,POST-OP STRIP,3.5X14: Brand: MEDLINE

## (undated) DEVICE — GARMENT,MEDLINE,DVT,INT,CALF,MED, GEN2: Brand: MEDLINE

## (undated) DEVICE — SOLUTION SCRB 4OZ 4% CHG H2O AIDED FOR PREOPERATIVE SKIN

## (undated) DEVICE — GLOVE SURG SZ 65 THK91MIL LTX FREE SYN POLYISOPRENE

## (undated) DEVICE — BLADE ES L6IN ELASTOMERIC COAT EXT DURABLE BEND UPTO 90DEG

## (undated) DEVICE — MITT SURG PREP L ADH DISPOSABLE